# Patient Record
Sex: FEMALE | Race: WHITE | Employment: OTHER | URBAN - NONMETROPOLITAN AREA
[De-identification: names, ages, dates, MRNs, and addresses within clinical notes are randomized per-mention and may not be internally consistent; named-entity substitution may affect disease eponyms.]

---

## 2018-07-06 LAB — DIABETIC RETINOPATHY: NEGATIVE

## 2019-04-16 LAB
AVERAGE GLUCOSE: NORMAL
HBA1C MFR BLD: 6.2 %

## 2019-05-29 DIAGNOSIS — R10.84 GENERALIZED ABDOMINAL PAIN: ICD-10-CM

## 2019-05-29 DIAGNOSIS — R63.4 WEIGHT LOSS: Primary | ICD-10-CM

## 2019-05-30 ENCOUNTER — OFFICE VISIT (OUTPATIENT)
Dept: PRIMARY CARE CLINIC | Age: 64
End: 2019-05-30
Payer: COMMERCIAL

## 2019-05-30 VITALS
HEIGHT: 62 IN | WEIGHT: 170 LBS | RESPIRATION RATE: 18 BRPM | BODY MASS INDEX: 31.28 KG/M2 | DIASTOLIC BLOOD PRESSURE: 68 MMHG | HEART RATE: 96 BPM | TEMPERATURE: 97.9 F | OXYGEN SATURATION: 98 % | SYSTOLIC BLOOD PRESSURE: 118 MMHG

## 2019-05-30 DIAGNOSIS — I10 ESSENTIAL HYPERTENSION: ICD-10-CM

## 2019-05-30 DIAGNOSIS — E11.65 TYPE 2 DIABETES MELLITUS WITH HYPERGLYCEMIA, WITHOUT LONG-TERM CURRENT USE OF INSULIN (HCC): ICD-10-CM

## 2019-05-30 DIAGNOSIS — R14.0 ABDOMINAL DISTENTION: ICD-10-CM

## 2019-05-30 DIAGNOSIS — R10.84 GENERALIZED ABDOMINAL PAIN: Primary | ICD-10-CM

## 2019-05-30 DIAGNOSIS — E78.2 MIXED HYPERLIPIDEMIA: ICD-10-CM

## 2019-05-30 PROBLEM — D64.9 ANEMIA: Status: ACTIVE | Noted: 2019-05-30

## 2019-05-30 PROCEDURE — 99214 OFFICE O/P EST MOD 30 MIN: CPT | Performed by: INTERNAL MEDICINE

## 2019-05-30 RX ORDER — LISINOPRIL AND HYDROCHLOROTHIAZIDE 20; 12.5 MG/1; MG/1
1 TABLET ORAL DAILY
Refills: 1 | COMMUNITY
Start: 2019-04-10 | End: 2019-07-11

## 2019-05-30 RX ORDER — RANITIDINE 150 MG/1
1 TABLET ORAL DAILY
Refills: 1 | COMMUNITY
Start: 2019-04-10 | End: 2019-07-11

## 2019-05-30 RX ORDER — METFORMIN HYDROCHLORIDE 500 MG/1
1 TABLET, EXTENDED RELEASE ORAL 2 TIMES DAILY
Refills: 1 | COMMUNITY
Start: 2019-04-10 | End: 2019-07-11

## 2019-05-30 RX ORDER — MULTIVIT WITH MINERALS/LUTEIN
1000 TABLET ORAL DAILY
COMMUNITY
End: 2019-07-11

## 2019-05-30 RX ORDER — DICYCLOMINE HYDROCHLORIDE 10 MG/1
10 CAPSULE ORAL 4 TIMES DAILY
Qty: 40 CAPSULE | Refills: 3 | Status: SHIPPED | OUTPATIENT
Start: 2019-05-30 | End: 2019-07-11

## 2019-05-30 RX ORDER — SIMVASTATIN 10 MG
1 TABLET ORAL NIGHTLY
Refills: 1 | COMMUNITY
Start: 2019-04-10 | End: 2019-07-11

## 2019-05-30 SDOH — HEALTH STABILITY: MENTAL HEALTH: HOW OFTEN DO YOU HAVE A DRINK CONTAINING ALCOHOL?: NEVER

## 2019-05-30 NOTE — PROGRESS NOTES
2019    Name: Cynthia Huitron : 1955 Sex: female  Age: 58 y.o. Subjective:  Chief Complaint   Patient presents with    Abdominal Pain     symptoms x 1-2 months    Bloated        HPI     Patient has had increasing problems with abdominal discomfort, pain and cramping, occasionally has diarrhea. No constipation. No black stools or blood in the stools. She had a colonoscopy about a year ago which showed diverticuli and a polyp. . She says that this all started after she had her gallbladder out last year. Things are getting much worse and it's difficult for her to keep things down. She talked with a gastroenterologist who her on Carafate 1 g 3 times a day and it caused more diarrhea so she was off of it after 3 weeks. Review of Systems   Constitutional: Negative for fatigue and fever. Respiratory: Negative for chest tightness and shortness of breath. Cardiovascular: Negative for chest pain, palpitations and leg swelling. Gastrointestinal: Positive for abdominal distention, abdominal pain, diarrhea and nausea. Genitourinary: Negative for dysuria. Hematological: Does not bruise/bleed easily.           Current Outpatient Medications:     metFORMIN (GLUCOPHAGE-XR) 500 MG extended release tablet, Take 1 tablet by mouth 2 times daily, Disp: , Rfl: 1    lisinopril-hydrochlorothiazide (PRINZIDE;ZESTORETIC) 20-12.5 MG per tablet, Take 1 tablet by mouth daily, Disp: , Rfl: 1    simvastatin (ZOCOR) 10 MG tablet, Take 1 tablet by mouth nightly, Disp: , Rfl: 1    ranitidine (ZANTAC) 150 MG tablet, Take 1 tablet by mouth daily, Disp: , Rfl: 1    Cholecalciferol (VITAMIN D3) 25 MCG TABS, Take 1 tablet by mouth daily, Disp: , Rfl:     vitamin E 1000 units capsule, Take 1,000 Units by mouth daily, Disp: , Rfl:     Chromium-Cinnamon (CINNAMON PLUS CHROMIUM) 200-1000 MCG-MG CAPS, Take 2 tablets by mouth daily, Disp: , Rfl:     dicyclomine (BENTYL) 10 MG capsule, Take 1 capsule by mouth 4 times daily Prn abdominal pain, Disp: 40 capsule, Rfl: 3     No Known Allergies     Past Medical History:   Diagnosis Date    Abdominal distention 5/30/2019    Anemia 5/30/2019    Essential hypertension 5/30/2019    Generalized abdominal pain 5/30/2019    Mixed hyperlipidemia 5/30/2019    Type 2 diabetes mellitus with hyperglycemia, without long-term current use of insulin ([facility] [address] 5/30/2019       Health Maintenance Due   Topic Date Due    Potassium monitoring  1955    Creatinine monitoring  1955    Hepatitis C screen  1955    Pneumococcal 0-64 years Vaccine (1 of 1 - PPSV23) 12/31/1960    Diabetic foot exam  12/31/1964    A1C test (Diabetic or Prediabetic)  12/31/1964    Diabetic retinal exam  12/31/1964    Lipid screen  12/31/1964    HIV screen  12/31/1969    Diabetic microalbuminuria test  12/31/1972    DTaP/Tdap/Td vaccine (1 - Tdap) 12/31/1973    Cervical cancer screen  12/31/1975    Breast cancer screen  12/31/2004    Shingles Vaccine (1 of 2) 12/31/2004    Colon cancer screen colonoscopy  12/31/2004        Patient Active Problem List   Diagnosis    Generalized abdominal pain    Abdominal distention    Type 2 diabetes mellitus with hyperglycemia, without long-term current use of insulin ([facility] [address]    Essential hypertension    Mixed hyperlipidemia    Anemia        Past Surgical History:   Procedure Laterality Date    CHOLECYSTECTOMY  07/2018        No family history on file.      Social History     Tobacco Use    Smoking status: Never Smoker    Smokeless tobacco: Never Used   Substance Use Topics    Alcohol use: Never     Frequency: Never    Drug use: Not on file        Objective  Vitals:    05/30/19 1138   BP: 118/68   Site: Left Upper Arm   Position: Sitting   Cuff Size: Medium Adult   Pulse: 96   Resp: 18   Temp: 97.9 °F (36.6 °C)   TempSrc: Temporal   SpO2: 98%   Weight: 170 lb (77.1 kg)   Height: 5' 2\" (1.575 m)        Exam:  Physical Exam   Constitutional: She appears well-developed. No distress. Cardiovascular: Normal rate and normal heart sounds. Pulmonary/Chest: Effort normal and breath sounds normal.   Abdominal:   Abdomen is soft, slightly distended. Bowel sounds are present. She is tender to palpation in epigastric, right upper and left upper quadrant. No  rebound tenderness. Though she has some voluntary guarding        Last labs reviewed  Blood work from [facility] reviewed 4/16/2019. BUN 33, creatinine 1.4. Estimated GFR 38. Total cholesterol 112. Rest of lipids are good. White blood cell count was normal then. Hemoglobin low at 10.1 with hematocrit of 29.2. Normocytic normochromic indices. ASSESSMENT & PLAN :   Problem List        Circulatory    Essential hypertension     Blood pressures are stable. Continue medications and monitor blood pressures at home. Call office if systolics are over 725 over diastolics over 90. Relevant Medications    lisinopril-hydrochlorothiazide (PRINZIDE;ZESTORETIC) 20-12.5 MG per tablet    simvastatin (ZOCOR) 10 MG tablet       Endocrine    Type 2 diabetes mellitus with hyperglycemia, without long-term current use of insulin ([address]     Watch her diet. Monitor her blood sugars as directed. Let me know if her blood sugars are consistently elevated over 120 fasting.  If imaging tests are normal, consider getting a gastric emptying time to rule out gastroparesis         Relevant Medications    metFORMIN (GLUCOPHAGE-XR) 500 MG extended release tablet       Other    Generalized abdominal pain - Primary      Obtain Prior authorization for a CT scan of her abdomen with oral contrast only as she does have chronic kidney disease         Relevant Orders    CT ABDOMEN WO CONTRAST Additional Contrast? Oral    AMYLASE    LIPASE    Abdominal distention     Trial of Bentyl when necessary         Relevant Orders    CT ABDOMEN WO CONTRAST Additional Contrast? Oral    Mixed hyperlipidemia     Watch saturated fats in diet and will monitor lipids         Relevant Medications    lisinopril-hydrochlorothiazide (PRINZIDE;ZESTORETIC) 20-12.5 MG per tablet    simvastatin (ZOCOR) 10 MG tablet           Return in about 2 weeks (around 6/13/2019).        Alysia Beckford DO  6/2/2019

## 2019-06-02 ASSESSMENT — ENCOUNTER SYMPTOMS
ABDOMINAL PAIN: 1
NAUSEA: 1
ABDOMINAL DISTENTION: 1
SHORTNESS OF BREATH: 0
CHEST TIGHTNESS: 0
DIARRHEA: 1

## 2019-06-03 NOTE — ASSESSMENT & PLAN NOTE
Obtain Prior authorization for a CT scan of her abdomen with oral contrast only as she does have chronic kidney disease

## 2019-06-03 NOTE — ASSESSMENT & PLAN NOTE
Watch her diet. Monitor her blood sugars as directed. Let me know if her blood sugars are consistently elevated over 120 fasting.  If imaging tests are normal, consider getting a gastric emptying time to rule out gastroparesis

## 2019-06-03 NOTE — ASSESSMENT & PLAN NOTE
Blood pressures are stable. Continue medications and monitor blood pressures at home. Call office if systolics are over 729 over diastolics over 90.

## 2019-06-10 ENCOUNTER — TELEPHONE (OUTPATIENT)
Dept: PRIMARY CARE CLINIC | Age: 64
End: 2019-06-10

## 2019-06-10 NOTE — TELEPHONE ENCOUNTER
Patient is agreeable to see GI doctor. She has her colonoscopies done with Dr Marissa Willett. Are you able to put in a new referral to his office and I will fax it over? Thanks !

## 2019-06-11 ENCOUNTER — TELEPHONE (OUTPATIENT)
Dept: PRIMARY CARE CLINIC | Age: 64
End: 2019-06-11

## 2019-06-11 DIAGNOSIS — R10.84 GENERALIZED ABDOMINAL PAIN: Primary | ICD-10-CM

## 2019-06-11 DIAGNOSIS — R14.0 ABDOMINAL DISTENTION: ICD-10-CM

## 2019-06-11 NOTE — TELEPHONE ENCOUNTER
Patient called back. She wants the ultrasound of her abdomen done. I informed her that we faxed the order to [facility]. She says she sees Dr Filiberto Yadav at the hospital. When she gets the u/s results she will just show him and then we will go from there on if she needs to see him or not.

## 2019-06-11 NOTE — TELEPHONE ENCOUNTER
Referral made to Dr. Melisa Baumgarten  Please call his office and find out how soon they can see her

## 2019-06-19 DIAGNOSIS — R10.84 GENERALIZED ABDOMINAL PAIN: ICD-10-CM

## 2019-06-19 DIAGNOSIS — R14.0 ABDOMINAL DISTENTION: ICD-10-CM

## 2019-06-26 ENCOUNTER — OFFICE VISIT (OUTPATIENT)
Dept: PRIMARY CARE CLINIC | Age: 64
End: 2019-06-26
Payer: COMMERCIAL

## 2019-06-26 VITALS
HEART RATE: 70 BPM | SYSTOLIC BLOOD PRESSURE: 124 MMHG | WEIGHT: 178 LBS | HEIGHT: 62 IN | BODY MASS INDEX: 32.76 KG/M2 | DIASTOLIC BLOOD PRESSURE: 62 MMHG

## 2019-06-26 VITALS
DIASTOLIC BLOOD PRESSURE: 60 MMHG | SYSTOLIC BLOOD PRESSURE: 120 MMHG | WEIGHT: 160.3 LBS | RESPIRATION RATE: 18 BRPM | BODY MASS INDEX: 29.5 KG/M2 | TEMPERATURE: 98.7 F | OXYGEN SATURATION: 96 % | HEIGHT: 62 IN | HEART RATE: 98 BPM

## 2019-06-26 DIAGNOSIS — D64.9 ANEMIA, UNSPECIFIED TYPE: ICD-10-CM

## 2019-06-26 DIAGNOSIS — E11.65 TYPE 2 DIABETES MELLITUS WITH HYPERGLYCEMIA, WITHOUT LONG-TERM CURRENT USE OF INSULIN (HCC): ICD-10-CM

## 2019-06-26 DIAGNOSIS — R10.84 GENERALIZED ABDOMINAL PAIN: Primary | ICD-10-CM

## 2019-06-26 DIAGNOSIS — R16.0 HEPATOMEGALY: ICD-10-CM

## 2019-06-26 DIAGNOSIS — I10 ESSENTIAL HYPERTENSION: ICD-10-CM

## 2019-06-26 PROBLEM — N18.30 ACUTE RENAL FAILURE SUPERIMPOSED ON STAGE 3 CHRONIC KIDNEY DISEASE (HCC): Status: ACTIVE | Noted: 2019-06-26

## 2019-06-26 PROBLEM — N17.9 ACUTE RENAL FAILURE SUPERIMPOSED ON STAGE 3 CHRONIC KIDNEY DISEASE (HCC): Status: ACTIVE | Noted: 2019-06-26

## 2019-06-26 PROCEDURE — 99215 OFFICE O/P EST HI 40 MIN: CPT | Performed by: INTERNAL MEDICINE

## 2019-06-26 RX ORDER — SUCRALFATE 1 G/1
1 TABLET ORAL 2 TIMES DAILY
COMMUNITY
End: 2019-07-11

## 2019-06-26 ASSESSMENT — ENCOUNTER SYMPTOMS
ABDOMINAL DISTENTION: 1
EYE ITCHING: 0
ANAL BLEEDING: 0
COLOR CHANGE: 0
EYE PAIN: 0
VOMITING: 0
SHORTNESS OF BREATH: 0
NAUSEA: 1
CHEST TIGHTNESS: 0
ABDOMINAL PAIN: 1
FACIAL SWELLING: 0
WHEEZING: 0
SORE THROAT: 0
TROUBLE SWALLOWING: 0
STRIDOR: 0
COUGH: 0
PHOTOPHOBIA: 0
BLOOD IN STOOL: 0
RHINORRHEA: 0
DIARRHEA: 1
CONSTIPATION: 0
EYE DISCHARGE: 0

## 2019-06-26 NOTE — PROGRESS NOTES
2019    Name: Charlotte Niño : 1955 Sex: female  Age: 58 y.o. Subjective:  Chief Complaint   Patient presents with    Results        HPI     Patient has had increasing problems with abdominal bloating, pain and cramping, occasionally has diarrhea. No constipation. No black stools or blood in the stools. She had a colonoscopy about a year ago which showed diverticuli and a polyp. . She says that this all started after she had her gallbladder out last year. Things are getting much worse and it's difficult for her to keep things down. She talked with a gastroenterologist who her on Carafate 1 g 3 times a day and it caused more diarrhea so she was off of it after 3 weeks. Requested CT scan of her abdomen but insurance denied this. Ultrasound was done and her liver was enlarged and she had a 15 mm hyperdense lesion in the central portion of the right lobe of the liver. Patient contacted Bob Jimenes who recommended an MRI which again was denied by insurance. He obtained some blood work on 2019. Hemoglobin dropped to 7.2 and hematocrit down to 21.5  This is  almost a 3 g drop from her  Hemoglobin in 2019. Her creatinine also increased. Her estimated GFR was 30, it was 38 in April. She is a little acidotic with a CO2 of 17 anion gap of 13. Nonfasting blood sugar was only 113. Doctor Marielle Cosby  ordered some other studies including a hep C antibody which was negative, anti-smooth muscle, antibody mitochondrial antibody which are pending. TAMIKO was pending and her alpha-1 antitrypsin level was elevated at 319. Patient feels tired and is weaker than  when I last saw her. Review of Systems   Constitutional: Positive for appetite change and fatigue. Negative for fever and unexpected weight change. Decreased appetite   HENT: Negative for congestion, ear pain, facial swelling, rhinorrhea, sore throat, tinnitus and trouble swallowing.     Eyes: Negative for photophobia, pain, discharge, itching and visual disturbance. Respiratory: Negative for cough, chest tightness, shortness of breath, wheezing and stridor. Cardiovascular: Negative for chest pain, palpitations and leg swelling. Gastrointestinal: Positive for abdominal distention, abdominal pain, diarrhea and nausea. Negative for anal bleeding, blood in stool, constipation and vomiting. Endocrine: Negative for cold intolerance, heat intolerance, polydipsia, polyphagia and polyuria. Genitourinary: Negative for difficulty urinating, dysuria, flank pain, frequency, hematuria and urgency. Musculoskeletal: Negative for arthralgias, gait problem, joint swelling and myalgias. Skin: Negative for color change, pallor and rash. Allergic/Immunologic: Negative for environmental allergies and food allergies. Neurological: Positive for weakness. Negative for dizziness, tremors, seizures, syncope, speech difficulty, light-headedness, numbness and headaches. Hematological: Negative for adenopathy. Does not bruise/bleed easily. Psychiatric/Behavioral: Negative for agitation, behavioral problems, confusion, sleep disturbance and suicidal ideas. The patient is not nervous/anxious.            Current Outpatient Medications:     aspirin 81 MG tablet, Take 81 mg by mouth daily, Disp: , Rfl:     hyoscyamine (LEVSIN/SL) 125 MCG sublingual tablet, Place 125 mcg under the tongue 3 times daily as needed (ABDOMINAL PAIN), Disp: , Rfl:     sucralfate (CARAFATE) 1 GM tablet, Take 1 g by mouth 2 times daily AS DIRECTED, Disp: , Rfl:     metFORMIN (GLUCOPHAGE-XR) 500 MG extended release tablet, Take 1 tablet by mouth 2 times daily, Disp: , Rfl: 1    lisinopril-hydrochlorothiazide (PRINZIDE;ZESTORETIC) 20-12.5 MG per tablet, Take 1 tablet by mouth daily, Disp: , Rfl: 1    simvastatin (ZOCOR) 10 MG tablet, Take 1 tablet by mouth nightly, Disp: , Rfl: 1    ranitidine (ZANTAC) 150 MG tablet, Take 1 tablet by mouth daily, Disp: , Rfl: 1   Cholecalciferol (VITAMIN D3) 25 MCG TABS, Take 1 tablet by mouth daily, Disp: , Rfl:     vitamin E 1000 units capsule, Take 1,000 Units by mouth daily, Disp: , Rfl:     Chromium-Cinnamon (CINNAMON PLUS CHROMIUM) 200-1000 MCG-MG CAPS, Take 2 tablets by mouth daily, Disp: , Rfl:     dicyclomine (BENTYL) 10 MG capsule, Take 1 capsule by mouth 4 times daily Prn abdominal pain, Disp: 40 capsule, Rfl: 3     No Known Allergies     Past Medical History:   Diagnosis Date    Abdominal distention 5/30/2019    Anemia 5/30/2019    Colon polyp     Eczema     Essential hypertension 5/30/2019    Generalized abdominal pain 5/30/2019    GERD (gastroesophageal reflux disease)     Mixed hyperlipidemia 5/30/2019    Osteoarthritis     Type 2 diabetes mellitus with hyperglycemia, without long-term current use of insulin ([facility] [address] 5/30/2019       Health Maintenance Due   Topic Date Due    Potassium monitoring  1955    Creatinine monitoring  1955    Hepatitis C screen  1955    Diabetic foot exam  12/31/1964    Lipid screen  12/31/1964    HIV screen  12/31/1969    Diabetic microalbuminuria test  12/31/1972    Cervical cancer screen  12/31/1975    Shingles Vaccine (1 of 2) 12/31/2004    DTaP/Tdap/Td vaccine (1 - Tdap) 06/03/2012    Breast cancer screen  05/14/2019    Diabetic retinal exam  07/06/2019        Patient Active Problem List   Diagnosis    Generalized abdominal pain    Abdominal distention    Type 2 diabetes mellitus with hyperglycemia, without long-term current use of insulin (HCC)    Essential hypertension    Mixed hyperlipidemia    Anemia    Acute renal failure superimposed on stage 3 chronic kidney disease (HCC)    Hepatomegaly        Past Surgical History:   Procedure Laterality Date    CHOLECYSTECTOMY  07/2018        Family History   Problem Relation Age of Onset    Heart Attack Mother     Colon Cancer Father         Social History     Tobacco Use    Smoking status: Never Smoker    Smokeless tobacco: Never Used   Substance Use Topics    Alcohol use: Never     Frequency: Never    Drug use: Never        Objective  Vitals:    06/26/19 1602   BP: 120/60   Site: Left Upper Arm   Position: Sitting   Cuff Size: Medium Adult   Pulse: 98   Resp: 18   Temp: 98.7 °F (37.1 °C)   TempSrc: Temporal   SpO2: 96%   Weight: 160 lb 4.8 oz (72.7 kg)   Height: 5' 2\" (1.575 m)        Exam:  Physical Exam   Constitutional: She is oriented to person, place, and time. She appears well-developed and well-nourished. No distress. HENT:   Head: Normocephalic. Right Ear: External ear normal.   Left Ear: External ear normal.   Nose: Nose normal.   Mouth/Throat: Oropharynx is clear and moist. No oropharyngeal exudate. Eyes: Pupils are equal, round, and reactive to light. EOM are normal. Right eye exhibits no discharge. Left eye exhibits no discharge. No scleral icterus. Pale conjunctiva   Neck: Normal range of motion. Neck supple. No thyromegaly present. Cardiovascular: Normal rate, regular rhythm and intact distal pulses. Exam reveals no gallop and no friction rub. Murmur heard. Soft systolic murmur   Pulmonary/Chest: Effort normal and breath sounds normal. No respiratory distress. She has no wheezes. She has no rales. She exhibits no tenderness. Abdominal: Soft. Bowel sounds are normal. She exhibits no distension and no mass. There is no tenderness. There is no rebound and no guarding. Abdomen is soft, slightly distended. Bowel sounds are present. She is tender to palpation in epigastric, right upper and left upper quadrant. No  rebound tenderness. Though she has some voluntary guarding. Liver is enlarged  Rectal exam revealed no stool in the rectal vault. I tried to do Hemoccult but there was not enough material to do a proper Hemoccult test.   Musculoskeletal: Normal range of motion. She exhibits no edema, tenderness or deformity. Lymphadenopathy:     She has no cervical adenopathy. Neurological: She is alert and oriented to person, place, and time. She displays normal reflexes. No cranial nerve deficit or sensory deficit. Skin: Skin is warm and dry. No rash noted. No erythema. No pallor. Psychiatric: She has a normal mood and affect. Her behavior is normal. Judgment and thought content normal.        Last labs reviewed  Blood work from [facility] reviewed 4/16/2019. BUN 33, creatinine 1.4. Estimated GFR 38. Total cholesterol 112. Rest of lipids are good. White blood cell count was normal then. Hemoglobin low at 10.1 with hematocrit of 29.2. Normocytic normochromic indices. Blood work from [facility] dated 6/20/2019 showed increased anion gap of 13 decreased CO2 17 BUN elevated at 34 creatinine 1.7. Estimated GFR is decreased to 30. Globulin elevated at 4.4 Albumin decreased to 3.2. Liver functions are normal.  White blood cell count was normal with a left shift. However hemoglobin  is now 7.2 with a hematocrit of 21.5. Platelet count elevated at 663,000,   Hep C antibody negative. smooth muscle antibody and antimitochondrial antibody pending. Alpha-1 antitrypsin level was  elevated at 319, TAMIKO pending    ASSESSMENT & PLAN :   Problem List        Circulatory    Essential hypertension     Blood pressure is good but we may need to discontinue lisinopril/hydrochlorothiazide in view of her ARMIDA. Patient is to be sent to admitting for observation,   discussed with hospitalist.         Relevant Medications    lisinopril-hydrochlorothiazide (PRINZIDE;ZESTORETIC) 20-12.5 MG per tablet    simvastatin (ZOCOR) 10 MG tablet    aspirin 81 MG tablet       Digestive    Hepatomegaly     Patient needs further imaging which will be done while she is in the hospital            Endocrine    Type 2 diabetes mellitus with hyperglycemia, without long-term current use of insulin (HCC)     Blood sugars are good.   She is on metformin which will need to be held in view of her kidney function. Will probably be started on something else in the hospital         Relevant Medications    metFORMIN (GLUCOPHAGE-XR) 500 MG extended release tablet       Other    Generalized abdominal pain - Primary     Admit observation at [facility]         Anemia     Will need work-up. Could be due to iron deficiency from acute on chronic blood loss. May also partially be secondary to chronic kidney disease. Return in about 1 month (around 7/24/2019) for Call me on discharge from [facility].        Joy Roy,   6/26/2019

## 2019-06-26 NOTE — ASSESSMENT & PLAN NOTE
Blood pressure is good but we may need to discontinue lisinopril/hydrochlorothiazide in view of her ARMIDA.   Patient is to be sent to admitting for observation,   discussed with hospitalist.

## 2019-06-26 NOTE — ASSESSMENT & PLAN NOTE
Blood sugars are good. She is on metformin which will need to be held in view of her kidney function.   Will probably be started on something else in the hospital

## 2019-06-26 NOTE — PATIENT INSTRUCTIONS
Supervision admission to [facility]. Hospitalist agreed to take patient.   Admitting informed

## 2019-07-09 ENCOUNTER — TELEPHONE (OUTPATIENT)
Dept: PRIMARY CARE CLINIC | Age: 64
End: 2019-07-09

## 2019-07-11 ENCOUNTER — TELEPHONE (OUTPATIENT)
Dept: ADMINISTRATIVE | Age: 64
End: 2019-07-11

## 2019-07-11 ENCOUNTER — OFFICE VISIT (OUTPATIENT)
Dept: PRIMARY CARE CLINIC | Age: 64
End: 2019-07-11
Payer: COMMERCIAL

## 2019-07-11 VITALS
HEIGHT: 62 IN | SYSTOLIC BLOOD PRESSURE: 124 MMHG | BODY MASS INDEX: 30.18 KG/M2 | WEIGHT: 164 LBS | OXYGEN SATURATION: 95 % | HEART RATE: 89 BPM | RESPIRATION RATE: 16 BRPM | DIASTOLIC BLOOD PRESSURE: 62 MMHG | TEMPERATURE: 98.6 F

## 2019-07-11 DIAGNOSIS — I10 ESSENTIAL HYPERTENSION: ICD-10-CM

## 2019-07-11 DIAGNOSIS — D49.59 OVARIAN NEOPLASM: Primary | ICD-10-CM

## 2019-07-11 DIAGNOSIS — D49.59 OVARY NEOPLASM: ICD-10-CM

## 2019-07-11 DIAGNOSIS — E11.65 TYPE 2 DIABETES MELLITUS WITH HYPERGLYCEMIA, WITHOUT LONG-TERM CURRENT USE OF INSULIN (HCC): ICD-10-CM

## 2019-07-11 DIAGNOSIS — D50.0 IRON DEFICIENCY ANEMIA DUE TO CHRONIC BLOOD LOSS: ICD-10-CM

## 2019-07-11 PROCEDURE — 99214 OFFICE O/P EST MOD 30 MIN: CPT | Performed by: INTERNAL MEDICINE

## 2019-07-11 RX ORDER — M-VIT,TX,IRON,MINS/CALC/FOLIC 27MG-0.4MG
1 TABLET ORAL DAILY
COMMUNITY

## 2019-07-11 RX ORDER — PANTOPRAZOLE SODIUM 40 MG/1
1 TABLET, DELAYED RELEASE ORAL DAILY
Refills: 0 | COMMUNITY
Start: 2019-06-28 | End: 2019-07-11

## 2019-07-11 ASSESSMENT — ENCOUNTER SYMPTOMS
ABDOMINAL DISTENTION: 1
EYE ITCHING: 0
EYE DISCHARGE: 0
ABDOMINAL PAIN: 0
RHINORRHEA: 0
FACIAL SWELLING: 0
DIARRHEA: 0
COUGH: 0
SORE THROAT: 0
ANAL BLEEDING: 0
WHEEZING: 0
VOMITING: 0
STRIDOR: 0
COLOR CHANGE: 0
EYE PAIN: 0
SHORTNESS OF BREATH: 0
CONSTIPATION: 0
TROUBLE SWALLOWING: 0
NAUSEA: 0
BLOOD IN STOOL: 0
PHOTOPHOBIA: 0

## 2019-07-11 NOTE — PROGRESS NOTES
2019    Name: Jennifer Murphy : 1955 Sex: female  Age: 58 y.o. Subjective:  Chief Complaint   Patient presents with    Results     to review results. KEVIN Flanagan was hospitalized with severe anemia and ascites. She was transfused with 1 unit of packed red blood cells and started on oral iron. No CBC has been rechecked. MRI of her abdomen was done which showed a lesion in her liver consistent with a hemangioma. Paracentesis was done which revealed cells which were positive for malignancy. Dr. Sigifredo Rivera ordered an ultrasound of her pelvis on 7/10/2019. This revealed endometrial thickening. There were  right ovarian and adnexal abnormalities suggestive of malignancy. She I sstill weak. She has gone back to work. She was hospitalized from 2019 and discharged on 2019. The week after that she was on vacation and then she returned to work full-time. EGD was done on Monday which was negative for abnormalities. per patient. She has had intermittent abdominal bloating since 2019. We discussed referral to gynecologist and gynecological oncological isurgeon. Recommended Dr. Lizeth Aguayo at [facility] in [place]. Because of her insurance she had will need to see a gynecologist in the network  first.  She chose Dr. Ailyn Coley. I contacted Dr. Deonte Land office and made arrangements for her to be seen in a timely fashion. Ultrasound results and all other labs will be faxed to Dr. Deonte Land office. On discharge she was taken off all her medications with exceptions of some vitamins. This included Carafate, simvastatin, ranitidine, pantoprazole, metformin, lisinopril hydrochlorothiazide    Review of Systems   Constitutional: Positive for fatigue and unexpected weight change. Negative for appetite change. HENT: Negative for congestion, ear pain, facial swelling, rhinorrhea, sore throat, tinnitus and trouble swallowing.     Eyes: Negative for photophobia,

## 2019-07-11 NOTE — PATIENT INSTRUCTIONS
Get blood work done at [facility] tomorrow  Dr Valencia Leyden office will call you with appointment

## 2019-07-12 ENCOUNTER — TELEPHONE (OUTPATIENT)
Dept: PRIMARY CARE CLINIC | Age: 64
End: 2019-07-12

## 2019-07-12 DIAGNOSIS — D50.0 IRON DEFICIENCY ANEMIA DUE TO CHRONIC BLOOD LOSS: ICD-10-CM

## 2019-07-12 LAB
BASOPHILS ABSOLUTE: ABNORMAL /ΜL
BASOPHILS RELATIVE PERCENT: ABNORMAL %
CA 125: 270.9
EOSINOPHILS ABSOLUTE: ABNORMAL /ΜL
EOSINOPHILS RELATIVE PERCENT: ABNORMAL %
FERRITIN: 398.8 NG/ML (ref 9–150)
HCT VFR BLD CALC: 24.5 % (ref 36–46)
HEMOGLOBIN: 8.2 G/DL (ref 12–16)
IRON: 26
LYMPHOCYTES ABSOLUTE: ABNORMAL /ΜL
LYMPHOCYTES RELATIVE PERCENT: ABNORMAL %
MCH RBC QN AUTO: 28.5 PG
MCHC RBC AUTO-ENTMCNC: 33.5 G/DL
MCV RBC AUTO: 85.1 FL
MONOCYTES ABSOLUTE: ABNORMAL /ΜL
MONOCYTES RELATIVE PERCENT: ABNORMAL %
NEUTROPHILS ABSOLUTE: ABNORMAL /ΜL
NEUTROPHILS RELATIVE PERCENT: ABNORMAL %
PDW BLD-RTO: ABNORMAL %
PLATELET # BLD: 563 K/ΜL
PMV BLD AUTO: ABNORMAL FL
RBC # BLD: 2.88 10^6/ΜL
TOTAL IRON BINDING CAPACITY: 223
WBC # BLD: 8.3 10^3/ML

## 2019-07-15 ENCOUNTER — TELEPHONE (OUTPATIENT)
Dept: PRIMARY CARE CLINIC | Age: 64
End: 2019-07-15

## 2019-07-15 NOTE — TELEPHONE ENCOUNTER
Patient informed. She is scheduled to see Dr Arvin Denver tomorrow, 7/15/19. She does not want to see Dr Candy Nguyen at this time.

## 2019-07-17 ENCOUNTER — TELEPHONE (OUTPATIENT)
Dept: PRIMARY CARE CLINIC | Age: 64
End: 2019-07-17

## 2019-08-01 DIAGNOSIS — D50.0 IRON DEFICIENCY ANEMIA DUE TO CHRONIC BLOOD LOSS: ICD-10-CM

## 2019-08-01 DIAGNOSIS — D49.59 OVARIAN NEOPLASM: ICD-10-CM

## 2019-08-16 LAB — DIABETIC RETINOPATHY: NEGATIVE

## 2019-08-22 LAB
ALBUMIN SERPL-MCNC: 4 G/DL
ALP BLD-CCNC: 82 U/L
ALT SERPL-CCNC: 27 U/L
ANION GAP SERPL CALCULATED.3IONS-SCNC: 7 MMOL/L
AST SERPL-CCNC: 26 U/L
BASOPHILS ABSOLUTE: 0 /ΜL
BASOPHILS RELATIVE PERCENT: 0.6 %
BILIRUB SERPL-MCNC: 0.6 MG/DL (ref 0.1–1.4)
BUN BLDV-MCNC: 33 MG/DL
CALCIUM SERPL-MCNC: 9.7 MG/DL
CHLORIDE BLD-SCNC: 105 MMOL/L
CO2: 27 MMOL/L
CREAT SERPL-MCNC: 1.1 MG/DL
EOSINOPHILS ABSOLUTE: 0.1 /ΜL
EOSINOPHILS RELATIVE PERCENT: 1.2 %
GFR CALCULATED: 50
GLUCOSE BLD-MCNC: 101 MG/DL
HCT VFR BLD CALC: 25.9 % (ref 36–46)
HEMOGLOBIN: 8.7 G/DL (ref 12–16)
LYMPHOCYTES ABSOLUTE: 0.9 /ΜL
LYMPHOCYTES RELATIVE PERCENT: 18.4 %
MCH RBC QN AUTO: 29.4 PG
MCHC RBC AUTO-ENTMCNC: 33.5 G/DL
MCV RBC AUTO: 87.7 FL
MONOCYTES ABSOLUTE: 0.4 /ΜL
MONOCYTES RELATIVE PERCENT: 8.9 %
NEUTROPHILS ABSOLUTE: 3.4 /ΜL
NEUTROPHILS RELATIVE PERCENT: 70.9 %
PDW BLD-RTO: 19.6 %
PLATELET # BLD: 390 K/ΜL
PMV BLD AUTO: 7.3 FL
POTASSIUM SERPL-SCNC: 4 MMOL/L
RBC # BLD: 2.95 10^6/ΜL
SODIUM BLD-SCNC: 139 MMOL/L
TOTAL PROTEIN: 7.5
WBC # BLD: 4.8 10^3/ML

## 2019-10-03 LAB — MAGNESIUM: 1.7 MG/DL

## 2019-12-05 ASSESSMENT — ENCOUNTER SYMPTOMS
RHINORRHEA: 0
STRIDOR: 0
ANAL BLEEDING: 0
EYE DISCHARGE: 0
ABDOMINAL PAIN: 0
NAUSEA: 0
FACIAL SWELLING: 0
COUGH: 0
DIARRHEA: 0
EYE ITCHING: 0
SORE THROAT: 0
VOMITING: 0
SHORTNESS OF BREATH: 0
PHOTOPHOBIA: 0
EYE PAIN: 0
CONSTIPATION: 0
WHEEZING: 0
TROUBLE SWALLOWING: 0
COLOR CHANGE: 0
BLOOD IN STOOL: 0

## 2019-12-10 RX ORDER — SUCRALFATE 1 G/1
1 TABLET ORAL 2 TIMES DAILY
COMMUNITY
End: 2019-12-12

## 2019-12-10 RX ORDER — METFORMIN HYDROCHLORIDE 500 MG/1
500 TABLET, EXTENDED RELEASE ORAL 2 TIMES DAILY
COMMUNITY
End: 2019-12-12 | Stop reason: ALTCHOICE

## 2019-12-10 RX ORDER — LISINOPRIL AND HYDROCHLOROTHIAZIDE 20; 12.5 MG/1; MG/1
1 TABLET ORAL DAILY
COMMUNITY
End: 2019-12-12 | Stop reason: ALTCHOICE

## 2019-12-10 RX ORDER — RANITIDINE 150 MG/1
150 TABLET ORAL NIGHTLY
COMMUNITY
End: 2020-06-09

## 2019-12-10 RX ORDER — SIMVASTATIN 10 MG
10 TABLET ORAL DAILY
COMMUNITY
End: 2019-12-12 | Stop reason: ALTCHOICE

## 2019-12-12 ENCOUNTER — TELEPHONE (OUTPATIENT)
Dept: PRIMARY CARE CLINIC | Age: 64
End: 2019-12-12

## 2019-12-12 ENCOUNTER — OFFICE VISIT (OUTPATIENT)
Dept: PRIMARY CARE CLINIC | Age: 64
End: 2019-12-12
Payer: COMMERCIAL

## 2019-12-12 VITALS
RESPIRATION RATE: 16 BRPM | HEART RATE: 88 BPM | SYSTOLIC BLOOD PRESSURE: 132 MMHG | TEMPERATURE: 98.3 F | WEIGHT: 152 LBS | HEIGHT: 62 IN | DIASTOLIC BLOOD PRESSURE: 80 MMHG | BODY MASS INDEX: 27.97 KG/M2 | OXYGEN SATURATION: 98 %

## 2019-12-12 DIAGNOSIS — E11.65 TYPE 2 DIABETES MELLITUS WITH HYPERGLYCEMIA, WITHOUT LONG-TERM CURRENT USE OF INSULIN (HCC): ICD-10-CM

## 2019-12-12 DIAGNOSIS — K21.9 GASTROESOPHAGEAL REFLUX DISEASE, ESOPHAGITIS PRESENCE NOT SPECIFIED: ICD-10-CM

## 2019-12-12 DIAGNOSIS — I10 ESSENTIAL HYPERTENSION: Primary | ICD-10-CM

## 2019-12-12 DIAGNOSIS — Z12.31 SCREENING MAMMOGRAM FOR HIGH-RISK PATIENT: ICD-10-CM

## 2019-12-12 DIAGNOSIS — N18.30 STAGE 3 CHRONIC KIDNEY DISEASE (HCC): ICD-10-CM

## 2019-12-12 DIAGNOSIS — E78.2 MIXED HYPERLIPIDEMIA: ICD-10-CM

## 2019-12-12 DIAGNOSIS — D64.9 ANEMIA, UNSPECIFIED TYPE: ICD-10-CM

## 2019-12-12 DIAGNOSIS — C56.9 MALIGNANT NEOPLASM OF OVARY, UNSPECIFIED LATERALITY (HCC): ICD-10-CM

## 2019-12-12 PROBLEM — N17.9 ACUTE RENAL FAILURE SUPERIMPOSED ON STAGE 3 CHRONIC KIDNEY DISEASE (HCC): Status: RESOLVED | Noted: 2019-06-26 | Resolved: 2019-12-12

## 2019-12-12 PROCEDURE — G8417 CALC BMI ABV UP PARAM F/U: HCPCS | Performed by: INTERNAL MEDICINE

## 2019-12-12 PROCEDURE — 2022F DILAT RTA XM EVC RTNOPTHY: CPT | Performed by: INTERNAL MEDICINE

## 2019-12-12 PROCEDURE — 3044F HG A1C LEVEL LT 7.0%: CPT | Performed by: INTERNAL MEDICINE

## 2019-12-12 PROCEDURE — 99214 OFFICE O/P EST MOD 30 MIN: CPT | Performed by: INTERNAL MEDICINE

## 2019-12-12 PROCEDURE — G9899 SCRN MAM PERF RSLTS DOC: HCPCS | Performed by: INTERNAL MEDICINE

## 2019-12-12 PROCEDURE — 3017F COLORECTAL CA SCREEN DOC REV: CPT | Performed by: INTERNAL MEDICINE

## 2019-12-12 PROCEDURE — 1036F TOBACCO NON-USER: CPT | Performed by: INTERNAL MEDICINE

## 2019-12-12 PROCEDURE — G8484 FLU IMMUNIZE NO ADMIN: HCPCS | Performed by: INTERNAL MEDICINE

## 2019-12-12 PROCEDURE — G8427 DOCREV CUR MEDS BY ELIG CLIN: HCPCS | Performed by: INTERNAL MEDICINE

## 2019-12-12 RX ORDER — HYDROCHLOROTHIAZIDE 25 MG/1
12.5 TABLET ORAL DAILY
COMMUNITY
End: 2021-01-21

## 2019-12-12 RX ORDER — BLOOD SUGAR DIAGNOSTIC
STRIP MISCELLANEOUS
COMMUNITY
End: 2019-12-12 | Stop reason: SDUPTHER

## 2019-12-12 RX ORDER — DIPHENHYDRAMINE HYDROCHLORIDE 25 MG/1
CAPSULE, LIQUID FILLED ORAL
Qty: 1 KIT | Refills: 0 | Status: SHIPPED | OUTPATIENT
Start: 2019-12-12

## 2019-12-12 RX ORDER — BLOOD SUGAR DIAGNOSTIC
STRIP MISCELLANEOUS
Qty: 100 EACH | Refills: 5 | Status: SHIPPED | OUTPATIENT
Start: 2019-12-12

## 2019-12-12 RX ORDER — LANCETS 23 GAUGE
EACH MISCELLANEOUS
Qty: 100 EACH | Refills: 3 | Status: SHIPPED | OUTPATIENT
Start: 2019-12-12

## 2019-12-12 RX ORDER — FERROUS SULFATE 325(65) MG
325 TABLET ORAL
COMMUNITY

## 2019-12-12 RX ORDER — DIPHENHYDRAMINE HYDROCHLORIDE 25 MG/1
CAPSULE, LIQUID FILLED ORAL
COMMUNITY
End: 2019-12-12 | Stop reason: SDUPTHER

## 2019-12-12 RX ORDER — LANCETS 23 GAUGE
EACH MISCELLANEOUS
COMMUNITY
End: 2019-12-12 | Stop reason: SDUPTHER

## 2019-12-12 ASSESSMENT — ENCOUNTER SYMPTOMS: ABDOMINAL DISTENTION: 0

## 2019-12-12 ASSESSMENT — PATIENT HEALTH QUESTIONNAIRE - PHQ9
2. FEELING DOWN, DEPRESSED OR HOPELESS: 1
1. LITTLE INTEREST OR PLEASURE IN DOING THINGS: 1
SUM OF ALL RESPONSES TO PHQ QUESTIONS 1-9: 2
SUM OF ALL RESPONSES TO PHQ9 QUESTIONS 1 & 2: 2
SUM OF ALL RESPONSES TO PHQ QUESTIONS 1-9: 2

## 2019-12-26 LAB
A/G RATIO: 1.3 RATIO (ref 1.1–2.2)
ALBUMIN SERPL-MCNC: 4.3 G/DL (ref 3.4–4.8)
ALP BLD-CCNC: 90 U/L (ref 42–121)
ALT SERPL-CCNC: 24 U/L (ref 10–54)
ANION GAP SERPL CALCULATED.3IONS-SCNC: 8 MEQ/L (ref 3–11)
AST SERPL-CCNC: 27 U/L (ref 10–41)
BASOPHILS ABSOLUTE: 0 K/UL (ref 0–0.2)
BASOPHILS RELATIVE PERCENT: 0.7 % (ref 0–1.5)
BILIRUB SERPL-MCNC: 0.5 MG/DL (ref 0.3–1.5)
BUN BLDV-MCNC: 36 MG/DL (ref 8–21)
CALCIUM SERPL-MCNC: 9.7 MG/DL (ref 8.5–10.5)
CHLORIDE BLD-SCNC: 101 MEQ/L (ref 98–107)
CHOLESTEROL: 211 MG/DL (ref 140–200)
CO2: 26 MEQ/L (ref 21–31)
CREAT SERPL-MCNC: 1.1 MG/DL (ref 0.4–1)
CREATININE URINE: 121.3 MG/DL (ref 22–328)
DIFFERENTIAL, MANUAL: NO
EOSINOPHILS ABSOLUTE: 0.1 K/UL (ref 0–0.33)
EOSINOPHILS RELATIVE PERCENT: 1.7 % (ref 0–3)
ESTIMATED AVERAGE GLUCOSE: 120 MG/DL
GFR AFRICAN AMERICAN: 61 ML/MIN
GFR NON-AFRICAN AMERICAN: 50 ML/MIN
GLOBULIN: 3.2 G/DL (ref 1.9–3.9)
GLUCOSE BLD-MCNC: 120 MG/DL (ref 70–99)
HBA1C MFR BLD: 5.8 % (ref 4–6)
HCT VFR BLD CALC: 27.5 % (ref 36–44)
HDLC SERPL-MCNC: 79 MG/DL (ref 35–85)
HEMOGLOBIN: 9.6 G/DL (ref 12–15)
LDL CHOLESTEROL: 103 MG/DL
LDL/HDL RATIO: 1.3 RATIO
LYMPHOCYTES ABSOLUTE: 1.1 K/UL (ref 1.1–4.8)
LYMPHOCYTES RELATIVE PERCENT: 31.9 % (ref 24–44)
MCH RBC QN AUTO: 35.7 PG (ref 28–34)
MCHC RBC AUTO-ENTMCNC: 34.7 G/DL (ref 33–37)
MCV RBC AUTO: 102.7 FL (ref 80–100)
MICROALBUMIN UR-MCNC: 19.4 UG/ML
MICROALBUMIN/CREAT UR-RTO: 16 MG/G
MONOCYTES ABSOLUTE: 0.4 K/UL (ref 0.2–0.7)
MONOCYTES RELATIVE PERCENT: 11.9 % (ref 3.4–9)
NEUTROPHILS ABSOLUTE: 1.9 K/UL (ref 1.83–8.7)
PDW BLD-RTO: 14.3 % (ref 10.9–14.3)
PLATELET # BLD: 288 K/UL (ref 150–450)
PMV BLD AUTO: 6.8 FL (ref 7.4–10.4)
POTASSIUM SERPL-SCNC: 3.8 MEQ/L (ref 3.6–5)
RBC # BLD: 2.68 M/UL (ref 4–4.9)
SEGMENTED NEUTROPHILS RELATIVE PERCENT: 53.8 % (ref 40–74)
SODIUM BLD-SCNC: 135 MEQ/L (ref 135–145)
TOTAL PROTEIN: 7.5 G/DL (ref 5.9–7.8)
TRIGL SERPL-MCNC: 74 MG/DL (ref 41–189)
WBC # BLD: 3.5 K/UL (ref 4.5–11)

## 2019-12-30 PROBLEM — Z90.710 S/P HYSTERECTOMY: Status: ACTIVE | Noted: 2019-12-30

## 2020-06-09 ENCOUNTER — OFFICE VISIT (OUTPATIENT)
Dept: PRIMARY CARE CLINIC | Age: 65
End: 2020-06-09
Payer: COMMERCIAL

## 2020-06-09 VITALS
SYSTOLIC BLOOD PRESSURE: 100 MMHG | RESPIRATION RATE: 18 BRPM | OXYGEN SATURATION: 99 % | HEIGHT: 63 IN | DIASTOLIC BLOOD PRESSURE: 60 MMHG | TEMPERATURE: 98 F | WEIGHT: 181.2 LBS | BODY MASS INDEX: 32.11 KG/M2 | HEART RATE: 65 BPM

## 2020-06-09 PROBLEM — Z23 NEED FOR VACCINATION WITH 13-POLYVALENT PNEUMOCOCCAL CONJUGATE VACCINE: Status: ACTIVE | Noted: 2020-06-09

## 2020-06-09 PROBLEM — Z78.0 MENOPAUSE: Status: ACTIVE | Noted: 2020-06-09

## 2020-06-09 PROBLEM — K21.9 GERD (GASTROESOPHAGEAL REFLUX DISEASE): Status: RESOLVED | Noted: 2019-12-12 | Resolved: 2020-06-09

## 2020-06-09 PROBLEM — E11.65 TYPE 2 DIABETES MELLITUS WITH HYPERGLYCEMIA, WITHOUT LONG-TERM CURRENT USE OF INSULIN (HCC): Status: RESOLVED | Noted: 2019-05-30 | Resolved: 2020-06-09

## 2020-06-09 PROBLEM — I10 ESSENTIAL HYPERTENSION: Status: RESOLVED | Noted: 2019-05-30 | Resolved: 2020-06-09

## 2020-06-09 PROBLEM — E78.2 MIXED HYPERLIPIDEMIA: Status: RESOLVED | Noted: 2019-05-30 | Resolved: 2020-06-09

## 2020-06-09 PROCEDURE — G8400 PT W/DXA NO RESULTS DOC: HCPCS | Performed by: INTERNAL MEDICINE

## 2020-06-09 PROCEDURE — G8417 CALC BMI ABV UP PARAM F/U: HCPCS | Performed by: INTERNAL MEDICINE

## 2020-06-09 PROCEDURE — 3017F COLORECTAL CA SCREEN DOC REV: CPT | Performed by: INTERNAL MEDICINE

## 2020-06-09 PROCEDURE — G8427 DOCREV CUR MEDS BY ELIG CLIN: HCPCS | Performed by: INTERNAL MEDICINE

## 2020-06-09 PROCEDURE — 1036F TOBACCO NON-USER: CPT | Performed by: INTERNAL MEDICINE

## 2020-06-09 PROCEDURE — 1090F PRES/ABSN URINE INCON ASSESS: CPT | Performed by: INTERNAL MEDICINE

## 2020-06-09 PROCEDURE — 1123F ACP DISCUSS/DSCN MKR DOCD: CPT | Performed by: INTERNAL MEDICINE

## 2020-06-09 PROCEDURE — 99214 OFFICE O/P EST MOD 30 MIN: CPT | Performed by: INTERNAL MEDICINE

## 2020-06-09 PROCEDURE — 90670 PCV13 VACCINE IM: CPT | Performed by: INTERNAL MEDICINE

## 2020-06-09 PROCEDURE — 4040F PNEUMOC VAC/ADMIN/RCVD: CPT | Performed by: INTERNAL MEDICINE

## 2020-06-09 PROCEDURE — 3046F HEMOGLOBIN A1C LEVEL >9.0%: CPT | Performed by: INTERNAL MEDICINE

## 2020-06-09 PROCEDURE — 90471 IMMUNIZATION ADMIN: CPT | Performed by: INTERNAL MEDICINE

## 2020-06-09 PROCEDURE — 2022F DILAT RTA XM EVC RTNOPTHY: CPT | Performed by: INTERNAL MEDICINE

## 2020-06-09 ASSESSMENT — ENCOUNTER SYMPTOMS
RHINORRHEA: 0
SORE THROAT: 0
BLOOD IN STOOL: 0
SHORTNESS OF BREATH: 0
EYE ITCHING: 0
COUGH: 0
PHOTOPHOBIA: 0
EYE DISCHARGE: 0
ABDOMINAL PAIN: 0
CONSTIPATION: 0
EYE PAIN: 0
NAUSEA: 0
DIARRHEA: 0
ABDOMINAL DISTENTION: 0
BACK PAIN: 1
TROUBLE SWALLOWING: 0
STRIDOR: 0
VOMITING: 0
ANAL BLEEDING: 0
COLOR CHANGE: 0
WHEEZING: 0
FACIAL SWELLING: 0

## 2020-06-09 ASSESSMENT — PATIENT HEALTH QUESTIONNAIRE - PHQ9
1. LITTLE INTEREST OR PLEASURE IN DOING THINGS: 0
SUM OF ALL RESPONSES TO PHQ QUESTIONS 1-9: 0
2. FEELING DOWN, DEPRESSED OR HOPELESS: 0
SUM OF ALL RESPONSES TO PHQ9 QUESTIONS 1 & 2: 0
SUM OF ALL RESPONSES TO PHQ QUESTIONS 1-9: 0

## 2020-06-09 NOTE — PROGRESS NOTES
shot at [facility] on 9/26/2019. She had her eye examination done with Dr. Reshma Hall  in July 2019. We will get results. Review of Systems   Constitutional: Positive for fatigue. Negative for appetite change and unexpected weight change. HENT: Negative for congestion, ear pain, facial swelling, rhinorrhea, sore throat, tinnitus and trouble swallowing. Eyes: Negative for photophobia, pain, discharge, itching and visual disturbance. Respiratory: Negative for cough, shortness of breath, wheezing and stridor. Cardiovascular: Negative for chest pain, palpitations and leg swelling. Gastrointestinal: Negative for abdominal distention, abdominal pain, anal bleeding, blood in stool, constipation, diarrhea, nausea and vomiting. Endocrine: Negative for cold intolerance, heat intolerance, polydipsia, polyphagia and polyuria. Genitourinary: Negative for difficulty urinating, dysuria, flank pain, frequency, hematuria and urgency. Musculoskeletal: Positive for arthralgias and back pain. Negative for gait problem, joint swelling and myalgias. Skin: Negative for color change, pallor and rash. Allergic/Immunologic: Negative for environmental allergies and food allergies. Neurological: Negative for dizziness, tremors, seizures, syncope, speech difficulty, weakness, light-headedness, numbness and headaches. Hematological: Negative for adenopathy. Does not bruise/bleed easily. Psychiatric/Behavioral: Negative for agitation, behavioral problems, confusion, sleep disturbance and suicidal ideas. The patient is not nervous/anxious.            Current Outpatient Medications:     hydrochlorothiazide (HYDRODIURIL) 25 MG tablet, Take 12.5 mg by mouth daily , Disp: , Rfl:     ferrous sulfate 325 (65 Fe) MG tablet, Take 325 mg by mouth daily (with breakfast), Disp: , Rfl:     Blood Glucose Monitoring Suppl (BLOOD GLUCOSE MONITOR SYSTEM) w/Device KIT, Test blood sugars every morning and throughout the day PRN high or low blood sugar symptoms. , Disp: 1 kit, Rfl: 0    Alcohol Swabs (ALCOHOL PADS) 70 % PADS, Test blood sugars every morning and throughout the day PRN high or low blood sugar symptoms. , Disp: 100 each, Rfl: 5    Lancets 28G MISC, Test blood sugars every morning and throughout the day PRN high or low blood sugar symptoms. , Disp: 100 each, Rfl: 3    blood glucose test strips (ASCENSIA AUTODISC VI;ONE TOUCH ULTRA TEST VI) strip, Test blood sugars every morning and throughout the day PRN high or low blood sugar symptoms. , Disp: 100 each, Rfl: 5    acetaminophen (TYLENOL) 500 MG tablet, Take 500 mg by mouth every 6 hours as needed for Pain, Disp: , Rfl:     ibuprofen (ADVIL;MOTRIN) 200 MG tablet, Take 200 mg by mouth every 6 hours as needed for Pain, Disp: , Rfl:     Multiple Vitamins-Minerals (THERAPEUTIC MULTIVITAMIN-MINERALS) tablet, Take 1 tablet by mouth daily, Disp: , Rfl:     Cholecalciferol (VITAMIN D3) 1000 units CAPS, Take 1 capsule by mouth daily , Disp: , Rfl:     NONFORMULARY, Indications: coricidin as needed for cold , Disp: , Rfl:      Allergies   Allergen Reactions    Adhesive Tape         Past Medical History:   Diagnosis Date    Abdominal distention 5/30/2019    Acute renal failure superimposed on stage 3 chronic kidney disease ([address] 6/26/2019    Anemia 5/30/2019    Cancer ([facility] [address] 5/'19    Ovarian    Colon polyp     Eczema     Elevated BUN     AND CREATINE    Essential hypertension 5/30/2019    Generalized abdominal pain 5/30/2019    GERD (gastroesophageal reflux disease)     History of blood transfusion 5/'19    Mixed hyperlipidemia 5/30/2019    Osteoarthritis     Ovary neoplasm     Type 2 diabetes mellitus with hyperglycemia, without long-term current use of insulin ([address] 5/30/2019       Health Maintenance Due   Topic Date Due    Diabetic foot exam  12/31/1964    HIV screen  12/31/1969    DTaP/Tdap/Td vaccine (1 - Tdap) 12/31/1973    Shingles current use of insulin ([address] - Primary     Continue watching diet. Check hemoglobin A1c and urine microalbumin. Relevant Medications    Blood Glucose Monitoring Suppl (BLOOD GLUCOSE MONITOR SYSTEM) w/Device KIT    Alcohol Swabs (ALCOHOL PADS) 70 % PADS    Lancets 28G MISC    blood glucose test strips (ASCENSIA AUTODISC VI;ONE TOUCH ULTRA TEST VI) strip    Other Relevant Orders    Hemoglobin A1C    Microalbumin / Creatinine Urine Ratio    Ovarian cancer (HCC)    Relevant Medications    acetaminophen (TYLENOL) 500 MG tablet    ibuprofen (ADVIL;MOTRIN) 200 MG tablet       Genitourinary    Stage 3 chronic kidney disease (HCC)     Avoid NSAID's and will monitor kidney functions            Other    Mixed hyperlipidemia     Watch saturated fats in diet and will monitor lipids         Relevant Medications    hydrochlorothiazide (HYDRODIURIL) 25 MG tablet    Other Relevant Orders    Lipid Panel    Anemia     Dr. Douglas Colbert to monitor CBC and anemia profile         Menopause     DEXA scan as it has  been over 5 years since her last one         Relevant Orders    DEXA AXIAL SKELETON W VERTEBRAL FX ASST    Need for vaccination with 13-polyvalent pneumococcal conjugate vaccine     71years old so we will give her a Prevnar 13 vaccination         Relevant Orders    Pneumococcal conjugate vaccine 13-valent           Return in about 4 months (around 10/9/2020) for Welcome to Medicare visit.    Nohemy Sauer, DO  6/9/2020

## 2020-06-09 NOTE — ASSESSMENT & PLAN NOTE
Blood pressures are stable. Continue medications and monitor blood pressures at home. Call office if systolics are over 469 over diastolics over 90.

## 2020-07-01 LAB
CREATININE URINE: 46.3 MG/DL (ref 22–328)
ESTIMATED AVERAGE GLUCOSE: 128 MG/DL
HBA1C MFR BLD: 6.1 % (ref 4–6)
MICROALBUMIN UR-MCNC: < 2 UG/ML
MICROALBUMIN/CREAT UR-RTO: NORMAL MG/G

## 2020-08-14 ENCOUNTER — HOSPITAL ENCOUNTER (OUTPATIENT)
Age: 65
Discharge: HOME | End: 2020-08-16
Payer: MEDICARE

## 2020-08-14 ENCOUNTER — OFFICE VISIT (OUTPATIENT)
Dept: FAMILY MEDICINE CLINIC | Age: 65
End: 2020-08-14
Payer: MEDICARE

## 2020-08-14 VITALS
TEMPERATURE: 97.2 F | BODY MASS INDEX: 33.68 KG/M2 | HEIGHT: 62 IN | WEIGHT: 183 LBS | HEART RATE: 61 BPM | OXYGEN SATURATION: 99 % | SYSTOLIC BLOOD PRESSURE: 128 MMHG | RESPIRATION RATE: 18 BRPM | DIASTOLIC BLOOD PRESSURE: 82 MMHG

## 2020-08-14 LAB
BILIRUBIN, POC: NEGATIVE
BLOOD URINE, POC: NORMAL
CLARITY, POC: CLEAR
COLOR, POC: YELLOW
GLUCOSE URINE, POC: NEGATIVE
KETONES, POC: NEGATIVE
LEUKOCYTE EST, POC: NEGATIVE
NITRITE, POC: NEGATIVE
PH, POC: 5.5
PROTEIN, POC: NEGATIVE
SPECIFIC GRAVITY, POC: 1.01
UROBILINOGEN, POC: 0.2

## 2020-08-14 PROCEDURE — 4040F PNEUMOC VAC/ADMIN/RCVD: CPT | Performed by: PHYSICIAN ASSISTANT

## 2020-08-14 PROCEDURE — G8399 PT W/DXA RESULTS DOCUMENT: HCPCS | Performed by: PHYSICIAN ASSISTANT

## 2020-08-14 PROCEDURE — 81003 URINALYSIS AUTO W/O SCOPE: CPT | Performed by: PHYSICIAN ASSISTANT

## 2020-08-14 PROCEDURE — 99213 OFFICE O/P EST LOW 20 MIN: CPT | Performed by: PHYSICIAN ASSISTANT

## 2020-08-14 PROCEDURE — 87088 URINE BACTERIA CULTURE: CPT

## 2020-08-14 PROCEDURE — 1036F TOBACCO NON-USER: CPT | Performed by: PHYSICIAN ASSISTANT

## 2020-08-14 PROCEDURE — 3017F COLORECTAL CA SCREEN DOC REV: CPT | Performed by: PHYSICIAN ASSISTANT

## 2020-08-14 PROCEDURE — 1090F PRES/ABSN URINE INCON ASSESS: CPT | Performed by: PHYSICIAN ASSISTANT

## 2020-08-14 PROCEDURE — G8417 CALC BMI ABV UP PARAM F/U: HCPCS | Performed by: PHYSICIAN ASSISTANT

## 2020-08-14 PROCEDURE — G8427 DOCREV CUR MEDS BY ELIG CLIN: HCPCS | Performed by: PHYSICIAN ASSISTANT

## 2020-08-14 PROCEDURE — 1123F ACP DISCUSS/DSCN MKR DOCD: CPT | Performed by: PHYSICIAN ASSISTANT

## 2020-08-14 RX ORDER — NITROFURANTOIN 25; 75 MG/1; MG/1
100 CAPSULE ORAL 2 TIMES DAILY
Qty: 6 CAPSULE | Refills: 0 | Status: SHIPPED | OUTPATIENT
Start: 2020-08-14 | End: 2020-08-17

## 2020-08-14 NOTE — PROGRESS NOTES
Chief Complaint:   Urinary Tract Infection (pressure with urination)      History of Present Illness       Rafi Medina is a 71 y.o. old female who  presents to [facility] for urinary pressure for 1 week. Denies back or flank pain. Denies fever or chills. Has not taken any medications for her symptoms. Denies any pelvic pain, abdominal pain, vaginal discharge, vomiting, diarrhea, or lethargy. Patient denies other associated symptoms. Patient does have a long history of gynecological cancers and currently is being monitored as her Ca1 25 has been trending up. Patient denies other symptoms or presents for evaluation      ROS    Unless otherwise stated in this report or unable to obtain because of the patient's clinical or mental status as evidenced by the medical record, this patients's positive and negative responses for Review of Systems, constitutional, psych, eyes, ENT, cardiovascular, respiratory, gastrointestinal, neurological, genitourinary, musculoskeletal, integument systems and systems related to the presenting problem are either stated in the preceding or were not pertinent or were negative for the symptoms and/or complaints related to the medical problem.     Past Medical History:   Past Medical History:   Diagnosis Date    Abdominal distention 5/30/2019    Acute renal failure superimposed on stage 3 chronic kidney disease ([address] 6/26/2019    Anemia 5/30/2019    Cancer ([address] 5/'19    Ovarian    Colon polyp     Eczema     Elevated BUN     AND CREATINE    Essential hypertension 5/30/2019    Generalized abdominal pain 5/30/2019    GERD (gastroesophageal reflux disease)     History of blood transfusion 5/'19    Mixed hyperlipidemia 5/30/2019    Osteoarthritis     Ovary neoplasm     Type 2 diabetes mellitus with hyperglycemia, without long-term current use of insulin ([address] 5/30/2019        Past Surgical history:   Past Surgical History:   Procedure Laterality Date    ABDOMEN SURGERY 12/30/2019     XL/SRIDHAR/BSO/omentectomy, tumor debulking-Dr. Jamshid Chua [facility] [address]  07/2018    OTHER SURGICAL HISTORY  10/25/2019    laparascopy pernial biopsy dilation and curettage. DR. [facility]     Social History:  reports that she has never smoked. She has never used smokeless tobacco. She reports that she does not drink alcohol or use drugs. Family History: family history includes Colon Cancer in her father; Heart Attack in her mother. Allergies: Adhesive tape    Physical Exam         VS:  /82   Pulse 61   Temp 97.2 °F (36.2 °C)   Resp 18   Ht 5' 2\" (1.575 m)   Wt 183 lb (83 kg)   SpO2 99%   BMI 33.47 kg/m²        Constitutional:  Alert, development consistent with age. HEENT:  Atraumatic. Airway patent. PERRL. Neck:  Normal ROM. Supple. Lungs:  Clear to auscultation and breath sounds equal.  Heart:  Regular rate and rhythm, normal heart sounds, without pathological murmurs, ectopy, gallops, or rubs. Abdomen: Soft, mild suprapubic tenderness without rebound, rigidity, or guarding. BS X 4. No organomegaly. Back: No CVA tenderness bilaterally. Skin:  Normal turgor. Warm, dry, without visible rash, unless noted elsewhere. Neurological:  Alert and oriented. Motor functions intact. Responds to verbal commands. Lab / Imaging Results   (All laboratory and radiology results have been personally reviewed by myself)  Labs:  Results for orders placed or performed in visit on 08/14/20   POCT Urinalysis No Micro (Auto)   Result Value Ref Range    Color, UA yellow     Clarity, UA clear     Glucose, UA POC negative     Bilirubin, UA negative     Ketones, UA negative     Spec Grav, UA 1.010     Blood, UA POC trace     pH, UA 5.5     Protein, UA POC negative     Urobilinogen, UA 0.2     Leukocytes, UA negative     Nitrite, UA negative          Medical Decision Making:    Patient is well appearing, non toxic and appropriate for outpatient management.   Plan is for symptom management and PCP follow up. Assessment / Plan     Impression(s):  Assessment and Plan:  Roberto Thompson was seen today for urinary tract infection. Diagnoses and all orders for this visit:    Dysuria  -     POCT Urinalysis No Micro (Auto)  -     Culture, Urine; Future  -     nitrofurantoin, macrocrystal-monohydrate, (MACROBID) 100 MG capsule; Take 1 capsule by mouth 2 times daily for 3 days        Follow up with PCP in 7-10 days for repeat urine and recheck of symptoms. ER if changes or worse. Advised to take all medications as directed.      RAHEL NajeraC

## 2020-08-17 LAB — URINE CULTURE, ROUTINE: NORMAL

## 2020-10-13 ENCOUNTER — OFFICE VISIT (OUTPATIENT)
Dept: PRIMARY CARE CLINIC | Age: 65
End: 2020-10-13
Payer: MEDICARE

## 2020-10-13 VITALS
OXYGEN SATURATION: 97 % | SYSTOLIC BLOOD PRESSURE: 134 MMHG | HEART RATE: 77 BPM | WEIGHT: 185 LBS | HEIGHT: 62 IN | DIASTOLIC BLOOD PRESSURE: 78 MMHG | BODY MASS INDEX: 34.04 KG/M2 | TEMPERATURE: 97.5 F

## 2020-10-13 PROCEDURE — G0402 INITIAL PREVENTIVE EXAM: HCPCS | Performed by: INTERNAL MEDICINE

## 2020-10-13 PROCEDURE — 90694 VACC AIIV4 NO PRSRV 0.5ML IM: CPT | Performed by: INTERNAL MEDICINE

## 2020-10-13 PROCEDURE — 4040F PNEUMOC VAC/ADMIN/RCVD: CPT | Performed by: INTERNAL MEDICINE

## 2020-10-13 PROCEDURE — G0008 ADMIN INFLUENZA VIRUS VAC: HCPCS | Performed by: INTERNAL MEDICINE

## 2020-10-13 PROCEDURE — 3017F COLORECTAL CA SCREEN DOC REV: CPT | Performed by: INTERNAL MEDICINE

## 2020-10-13 PROCEDURE — 1123F ACP DISCUSS/DSCN MKR DOCD: CPT | Performed by: INTERNAL MEDICINE

## 2020-10-13 RX ORDER — ANASTROZOLE 1 MG/1
TABLET ORAL
COMMUNITY
Start: 2020-09-24 | End: 2021-01-01

## 2020-10-13 ASSESSMENT — LIFESTYLE VARIABLES: HOW OFTEN DO YOU HAVE A DRINK CONTAINING ALCOHOL: 0

## 2020-10-13 ASSESSMENT — PATIENT HEALTH QUESTIONNAIRE - PHQ9
SUM OF ALL RESPONSES TO PHQ QUESTIONS 1-9: 0
SUM OF ALL RESPONSES TO PHQ9 QUESTIONS 1 & 2: 0
2. FEELING DOWN, DEPRESSED OR HOPELESS: 0
SUM OF ALL RESPONSES TO PHQ QUESTIONS 1-9: 0
1. LITTLE INTEREST OR PLEASURE IN DOING THINGS: 0

## 2020-10-13 NOTE — PATIENT INSTRUCTIONS
benefits. Some of the tests and screenings are paid in full while other may be subject to a deductible, co-insurance, and/or copay. Some of these benefits include a comprehensive review of your medical history including lifestyle, illnesses that may run in your family, and various assessments and screenings as appropriate. After reviewing your medical record and screening and assessments performed today your provider may have ordered immunizations, labs, imaging, and/or referrals for you. A list of these orders (if applicable) as well as your Preventive Care list are included within your After Visit Summary for your review. Other Preventive Recommendations:    · A preventive eye exam performed by an eye specialist is recommended every 1-2 years to screen for glaucoma; cataracts, macular degeneration, and other eye disorders. · A preventive dental visit is recommended every 6 months. · Try to get at least 150 minutes of exercise per week or 10,000 steps per day on a pedometer . · Order or download the FREE \"Exercise & Physical Activity: Your Everyday Guide\" from [employer]. Call 4-480.129.3667 or search [employer] online. · You need 6622-8636 mg of calcium and 3431-9424 IU of vitamin D per day. It is possible to meet your calcium requirement with diet alone, but a vitamin D supplement is usually necessary to meet this goal.  · When exposed to the sun, use a sunscreen that protects against both UVA and UVB radiation with an SPF of 30 or greater. Reapply every 2 to 3 hours or after sweating, drying off with a towel, or swimming. · Always wear a seat belt when traveling in a car. Always wear a helmet when riding a bicycle or motorcycle.

## 2020-10-13 NOTE — PROGRESS NOTES
Medicare Annual Wellness Visit  Name: Jill Church Date: 10/13/2020   MRN: <[id number]> Sex: Female   Age: 71 y.o. Ethnicity: Non-/Non    : 1955 Race: Charo Martinez is here for Medicare AWV    Screenings for behavioral, psychosocial and functional/safety risks, and cognitive dysfunction are all negative except as indicated below. These results, as well as other patient data from the [address] [facility] [address] form, are documented in Flowsheets linked to this Encounter. Allergies   Allergen Reactions    Adhesive Tape        Prior to Visit Medications    Medication Sig Taking? Authorizing Provider   vitamin C (ASCORBIC ACID) 500 MG tablet Take 500 mg by mouth daily Yes Historical Provider, MD   diphenhydrAMINE (BENADRYL) 25 MG tablet Take 25 mg by mouth every 6 hours as needed for Itching Yes Historical Provider, MD   hydrochlorothiazide (HYDRODIURIL) 25 MG tablet Take 12.5 mg by mouth daily  Yes Historical Provider, MD   ferrous sulfate 325 (65 Fe) MG tablet Take 325 mg by mouth daily (with breakfast) Yes Historical Provider, MD   Blood Glucose Monitoring Suppl (BLOOD GLUCOSE MONITOR SYSTEM) w/Device KIT Test blood sugars every morning and throughout the day PRN high or low blood sugar symptoms. Yes Pita Bai, DO   Alcohol Swabs (ALCOHOL PADS) 70 % PADS Test blood sugars every morning and throughout the day PRN high or low blood sugar symptoms. Yes Renetta Cobian, DO   Lancets 28G MISC Test blood sugars every morning and throughout the day PRN high or low blood sugar symptoms. Yes Renetta Cobian, DO   blood glucose test strips (ASCENSIA AUTODISC VI;ONE TOUCH ULTRA TEST VI) strip Test blood sugars every morning and throughout the day PRN high or low blood sugar symptoms.  Yes Renetta Cobian, DO   NONFORMULARY Indications: coricidin as needed for cold  Yes Historical Provider, MD   acetaminophen (TYLENOL) 500 MG tablet Take 500 mg by mouth every 6 hours as needed for Pain Yes Historical Provider, MD   ibuprofen (ADVIL;MOTRIN) 200 MG tablet Take 200 mg by mouth every 6 hours as needed for Pain Yes Historical Provider, MD   Multiple Vitamins-Minerals (THERAPEUTIC MULTIVITAMIN-MINERALS) tablet Take 1 tablet by mouth daily Yes Historical Provider, MD   Cholecalciferol (VITAMIN D3) 1000 units CAPS Take 1 capsule by mouth daily  Yes Historical Provider, MD       Past Medical History:   Diagnosis Date    Abdominal distention 5/30/2019    Acute renal failure superimposed on stage 3 chronic kidney disease ([facility] [address] 6/26/2019    Anemia 5/30/2019    Cancer ([address] 5/'19    Ovarian    Colon polyp     Eczema     Elevated BUN     AND CREATINE    Essential hypertension 5/30/2019    Generalized abdominal pain 5/30/2019    GERD (gastroesophageal reflux disease)     History of blood transfusion 5/'19    Mixed hyperlipidemia 5/30/2019    Osteoarthritis     Ovary neoplasm     Type 2 diabetes mellitus with hyperglycemia, without long-term current use of insulin ([facility] [address] 5/30/2019       Past Surgical History:   Procedure Laterality Date    ABDOMEN SURGERY  12/30/2019     XL/SRIDHAR/BSO/omentectomy, tumor debulking-Dr. Katie Mendoza [facility] [address]  07/2018    OTHER SURGICAL HISTORY  10/25/2019    laparascopy pernial biopsy dilation and curettage.  DR. [facility]       Family History   Problem Relation Age of Onset    Heart Attack Mother     Colon Cancer Father        CareTeam (Including outside providers/suppliers regularly involved in providing care):   Patient Care Team:  Renetta Cobian DO as PCP - General (Internal Medicine)  Renetta Cobian DO as PCP - [facility] Empaneled Provider    Wt Readings from Last 3 Encounters:   10/13/20 185 lb (83.9 kg)   08/14/20 183 lb (83 kg)   08/04/20 183 lb (83 kg)     Vitals:    10/13/20 0921   BP: 134/78   Site: Right Upper Arm   Position: Sitting   Cuff Size: Medium Adult   Pulse: 77   Temp: 97.5 °F (36.4 °C)   TempSrc: Temporal   SpO2: 97%   Weight: 185 lb (83.9 kg)   Height: 5' 2\" (1.575 m)     Body mass index is 33.84 kg/m². Based upon direct observation of the patient, evaluation of cognition reveals recent and remote memory intact. Patient's complete Health Risk Assessment and screening values have been reviewed and are found in Flowsheets. The following problems were reviewed today and where indicated follow up appointments were made and/or referrals ordered. Positive Risk Factor Screenings with Interventions:     General Health and ACP:  General  In general, how would you say your health is?: Good  In the past 7 days, have you experienced any of the following?  New or Increased Pain, New or Increased Fatigue, Loneliness, Social Isolation, Stress or Anger?: (!) New or Increased Pain  Do you get the social and emotional support that you need?: Yes  Do you have a Living Will?: Yes  Advance Directives     Power of  Living Will ACP-Advance Directive ACP-Power of     Not on File Not on File Filed [address] [facility] [place] Risk Interventions:  · Pain issues: medication adjusted- take both Tylenol 500 mg and Ibuprofen 400 mg ( 2 pills ) at same time tid with food    Health Habits/Nutrition:  Health Habits/Nutrition  Do you exercise for at least 20 minutes 2-3 times per week?: Yes  Have you lost any weight without trying in the past 3 months?: No  Do you eat fewer than 2 meals per day?: No  Have you seen a dentist within the past year?: Yes  Body mass index: (!) 33.83  Health Habits/Nutrition Interventions:  · Nutritional issues:  educational materials for healthy, well-balanced diet provided    Hearing/Vision:  No exam data present  Hearing/Vision  Do you or your family notice any trouble with your hearing?: No  Do you have difficulty driving, watching TV, or doing any of your daily activities because of your eyesight?: (!) Yes  Have you had an eye exam within the past year?: Yes  Hearing/Vision Interventions:  · Vision concerns:  patient declines any further evaluation/treatment for this issue    Safety:  Safety  Do you have working smoke detectors?: Yes  Have all throw rugs been removed or fastened?: (!) No  Do you have non-slip mats or surfaces in all bathtubs/showers?: Yes  Do all of your stairways have a railing or banister?: Yes  Are your doorways, halls and stairs free of clutter?: Yes  Do you always fasten your seatbelt when you are in a car?: Yes  Safety Interventions:  · Home safety tips provided    Personalized Preventive Plan   Current Health Maintenance Status  Immunization History   Administered Date(s) Administered    Influenza A ([id number]) Vaccine PF IM 11/16/2009    Influenza Virus Vaccine 09/26/2019    Pneumococcal Conjugate 13-valent ([id number]) 06/09/2020    Pneumococcal Polysaccharide ([id number]) 04/04/2013    Td (Adult), 5 Lf Tetanus Toxoid, Pf (Tenivac, Decavac) 06/02/2012        Health Maintenance   Topic Date Due    Diabetic foot exam  12/31/1964    HIV screen  12/31/1969    DTaP/Tdap/Td vaccine (1 - Tdap) 12/31/1973    Shingles Vaccine (1 of 2) 12/31/2004    Annual Wellness Visit (AWV)  08/04/2020    Diabetic retinal exam  08/16/2020    Flu vaccine (1) 09/01/2020    Breast cancer screen  12/19/2020    Lipid screen  12/26/2020    Colon cancer screen colonoscopy  04/13/2021    Pneumococcal 65+ yrs at Risk Vaccine (2 of 2 - PPSV23) 06/09/2021    A1C test (Diabetic or Prediabetic)  07/01/2021    Diabetic microalbuminuria test  07/01/2021    Potassium monitoring  09/22/2021    Creatinine monitoring  09/22/2021    DEXA (modify frequency per FRAX score)  Completed    Hepatitis C screen  Completed    Hepatitis A vaccine  Aged Out    Hib vaccine  Aged Out    Meningococcal (ACWY) vaccine  Aged Out     Recommendations for Youth[employer] Media Due: see orders and patient instructions/AVS.  .   Recommended screening schedule for the next 5-10 years is provided to the patient in written form: see Patient Instructions/AVS.    There are no diagnoses linked to this encounter. Cardiovascular Disease Risk Counseling: Assessed the patient's risk to develop cardiovascular disease and reviewed main risk factors. Reviewed steps to reduce disease risk including:   · Quitting tobacco use, reducing amount smoked, or not starting the habit  · Making healthy food choices  · Being physically active and gradualy increasing activity levels   · Reduce weight and determine a healthy BMI goal  · Monitor blood pressure and treat if higher than 140/90 mmHg  · Maintain blood total cholesterol levels under 5 mmol/l or 190 mg/dl  · Maintain LDL cholesterol levels under 3.0 mmol/l or 115 mg/dl   · Control blood glucose levels  · Consider taking aspirin (75 mg daily), once blood pressure is controlled   Provided a follow up plan.   Time spent (minutes): 4

## 2020-10-14 ENCOUNTER — TELEPHONE (OUTPATIENT)
Dept: PRIMARY CARE CLINIC | Age: 65
End: 2020-10-14

## 2020-10-14 RX ORDER — AMOXICILLIN AND CLAVULANATE POTASSIUM 875; 125 MG/1; MG/1
1 TABLET, FILM COATED ORAL 2 TIMES DAILY
Qty: 14 TABLET | Refills: 0 | Status: SHIPPED | OUTPATIENT
Start: 2020-10-14 | End: 2020-10-21

## 2020-10-14 NOTE — TELEPHONE ENCOUNTER
Pt was seen yesterday, and was told an anitbiotic would be sent into pharmacy but nothing is there.  Please advise than you

## 2021-01-01 ENCOUNTER — OFFICE VISIT (OUTPATIENT)
Dept: PRIMARY CARE CLINIC | Age: 66
End: 2021-01-01
Payer: MEDICARE

## 2021-01-01 ENCOUNTER — TELEPHONE (OUTPATIENT)
Dept: PRIMARY CARE CLINIC | Age: 66
End: 2021-01-01

## 2021-01-01 ENCOUNTER — NURSE ONLY (OUTPATIENT)
Dept: PRIMARY CARE CLINIC | Age: 66
End: 2021-01-01
Payer: MEDICARE

## 2021-01-01 VITALS
SYSTOLIC BLOOD PRESSURE: 122 MMHG | OXYGEN SATURATION: 95 % | BODY MASS INDEX: 33.49 KG/M2 | TEMPERATURE: 97.8 F | HEART RATE: 79 BPM | WEIGHT: 182 LBS | DIASTOLIC BLOOD PRESSURE: 68 MMHG | HEIGHT: 62 IN

## 2021-01-01 VITALS
TEMPERATURE: 97.8 F | HEIGHT: 62 IN | HEART RATE: 97 BPM | BODY MASS INDEX: 33.86 KG/M2 | SYSTOLIC BLOOD PRESSURE: 136 MMHG | WEIGHT: 184 LBS | OXYGEN SATURATION: 98 % | DIASTOLIC BLOOD PRESSURE: 78 MMHG

## 2021-01-01 VITALS
BODY MASS INDEX: 34.57 KG/M2 | SYSTOLIC BLOOD PRESSURE: 120 MMHG | TEMPERATURE: 97.3 F | WEIGHT: 189 LBS | DIASTOLIC BLOOD PRESSURE: 64 MMHG | OXYGEN SATURATION: 98 % | HEART RATE: 80 BPM

## 2021-01-01 DIAGNOSIS — E11.65 TYPE 2 DIABETES MELLITUS WITH HYPERGLYCEMIA, WITHOUT LONG-TERM CURRENT USE OF INSULIN (HCC): Primary | ICD-10-CM

## 2021-01-01 DIAGNOSIS — Z23 NEED FOR SHINGLES VACCINE: ICD-10-CM

## 2021-01-01 DIAGNOSIS — D64.81 ANEMIA DUE TO ANTINEOPLASTIC CHEMOTHERAPY: ICD-10-CM

## 2021-01-01 DIAGNOSIS — C80.0 CARCINOMATOSIS (HCC): ICD-10-CM

## 2021-01-01 DIAGNOSIS — C56.9 MALIGNANT NEOPLASM OF OVARY, UNSPECIFIED LATERALITY (HCC): ICD-10-CM

## 2021-01-01 DIAGNOSIS — Z00.00 ROUTINE GENERAL MEDICAL EXAMINATION AT A HEALTH CARE FACILITY: Primary | ICD-10-CM

## 2021-01-01 DIAGNOSIS — Z23 NEED FOR INFLUENZA VACCINATION: ICD-10-CM

## 2021-01-01 DIAGNOSIS — E78.2 MIXED HYPERLIPIDEMIA: ICD-10-CM

## 2021-01-01 DIAGNOSIS — N18.31 STAGE 3A CHRONIC KIDNEY DISEASE (HCC): ICD-10-CM

## 2021-01-01 DIAGNOSIS — I10 ESSENTIAL HYPERTENSION: ICD-10-CM

## 2021-01-01 DIAGNOSIS — E11.65 TYPE 2 DIABETES MELLITUS WITH HYPERGLYCEMIA, WITHOUT LONG-TERM CURRENT USE OF INSULIN (HCC): ICD-10-CM

## 2021-01-01 DIAGNOSIS — T45.1X5A ANEMIA DUE TO ANTINEOPLASTIC CHEMOTHERAPY: ICD-10-CM

## 2021-01-01 DIAGNOSIS — C78.6 MALIGNANT NEOPLASM METASTATIC TO PERITONEUM (HCC): ICD-10-CM

## 2021-01-01 LAB
CHOLESTEROL: 208 MG/DL (ref 140–200)
DIABETIC RETINOPATHY: NEGATIVE
ESTIMATED AVERAGE GLUCOSE: 123 MG/DL
ESTIMATED AVERAGE GLUCOSE: 123 MG/DL
HBA1C MFR BLD: 5.9 % (ref 4–6)
HBA1C MFR BLD: 5.9 % (ref 4–6)
HDLC SERPL-MCNC: 117 MG/DL (ref 35–85)
LDL CHOLESTEROL: 60 MG/DL
LDL/HDL RATIO: 0.5 RATIO
TRIGL SERPL-MCNC: 54 MG/DL (ref 41–189)

## 2021-01-01 PROCEDURE — 1123F ACP DISCUSS/DSCN MKR DOCD: CPT | Performed by: INTERNAL MEDICINE

## 2021-01-01 PROCEDURE — G0439 PPPS, SUBSEQ VISIT: HCPCS | Performed by: INTERNAL MEDICINE

## 2021-01-01 PROCEDURE — G0008 ADMIN INFLUENZA VIRUS VAC: HCPCS | Performed by: INTERNAL MEDICINE

## 2021-01-01 PROCEDURE — 1090F PRES/ABSN URINE INCON ASSESS: CPT | Performed by: INTERNAL MEDICINE

## 2021-01-01 PROCEDURE — 2022F DILAT RTA XM EVC RTNOPTHY: CPT | Performed by: INTERNAL MEDICINE

## 2021-01-01 PROCEDURE — 99213 OFFICE O/P EST LOW 20 MIN: CPT | Performed by: INTERNAL MEDICINE

## 2021-01-01 PROCEDURE — G8399 PT W/DXA RESULTS DOCUMENT: HCPCS | Performed by: INTERNAL MEDICINE

## 2021-01-01 PROCEDURE — 4040F PNEUMOC VAC/ADMIN/RCVD: CPT | Performed by: INTERNAL MEDICINE

## 2021-01-01 PROCEDURE — 1036F TOBACCO NON-USER: CPT | Performed by: INTERNAL MEDICINE

## 2021-01-01 PROCEDURE — 3017F COLORECTAL CA SCREEN DOC REV: CPT | Performed by: INTERNAL MEDICINE

## 2021-01-01 PROCEDURE — 3044F HG A1C LEVEL LT 7.0%: CPT | Performed by: INTERNAL MEDICINE

## 2021-01-01 PROCEDURE — G8484 FLU IMMUNIZE NO ADMIN: HCPCS | Performed by: INTERNAL MEDICINE

## 2021-01-01 PROCEDURE — G8427 DOCREV CUR MEDS BY ELIG CLIN: HCPCS | Performed by: INTERNAL MEDICINE

## 2021-01-01 PROCEDURE — 90732 PPSV23 VACC 2 YRS+ SUBQ/IM: CPT | Performed by: INTERNAL MEDICINE

## 2021-01-01 PROCEDURE — G8417 CALC BMI ABV UP PARAM F/U: HCPCS | Performed by: INTERNAL MEDICINE

## 2021-01-01 PROCEDURE — 90694 VACC AIIV4 NO PRSRV 0.5ML IM: CPT | Performed by: INTERNAL MEDICINE

## 2021-01-01 PROCEDURE — G0009 ADMIN PNEUMOCOCCAL VACCINE: HCPCS | Performed by: INTERNAL MEDICINE

## 2021-01-01 RX ORDER — AMLODIPINE BESYLATE 5 MG/1
TABLET ORAL
COMMUNITY
Start: 2021-03-26 | End: 2021-01-01

## 2021-01-01 RX ORDER — LISINOPRIL AND HYDROCHLOROTHIAZIDE 12.5; 1 MG/1; MG/1
1 TABLET ORAL 2 TIMES DAILY
COMMUNITY

## 2021-01-01 RX ORDER — PROCHLORPERAZINE MALEATE 10 MG
TABLET ORAL
COMMUNITY
Start: 2021-01-01

## 2021-01-01 SDOH — ECONOMIC STABILITY: FOOD INSECURITY: WITHIN THE PAST 12 MONTHS, YOU WORRIED THAT YOUR FOOD WOULD RUN OUT BEFORE YOU GOT MONEY TO BUY MORE.: NEVER TRUE

## 2021-01-01 SDOH — ECONOMIC STABILITY: FOOD INSECURITY: WITHIN THE PAST 12 MONTHS, THE FOOD YOU BOUGHT JUST DIDN'T LAST AND YOU DIDN'T HAVE MONEY TO GET MORE.: NEVER TRUE

## 2021-01-01 ASSESSMENT — ENCOUNTER SYMPTOMS
COLOR CHANGE: 0
COLOR CHANGE: 0
BACK PAIN: 1
VOMITING: 0
FACIAL SWELLING: 0
ABDOMINAL PAIN: 0
ROS SKIN COMMENTS: HANDS
SHORTNESS OF BREATH: 0
SORE THROAT: 0
RHINORRHEA: 0
TROUBLE SWALLOWING: 0
RHINORRHEA: 0
EYE DISCHARGE: 0
EYE ITCHING: 0
COUGH: 0
COUGH: 0
TROUBLE SWALLOWING: 0
PHOTOPHOBIA: 0
COLOR CHANGE: 0
TROUBLE SWALLOWING: 0
ABDOMINAL DISTENTION: 0
ANAL BLEEDING: 0
SHORTNESS OF BREATH: 0
EYE PAIN: 0
DIARRHEA: 0
DIARRHEA: 0
ANAL BLEEDING: 0
NAUSEA: 0
VOMITING: 0
EYE PAIN: 0
NAUSEA: 0
FACIAL SWELLING: 0
ABDOMINAL PAIN: 0
PHOTOPHOBIA: 0
DIARRHEA: 0
CONSTIPATION: 0
EYE ITCHING: 0
SORE THROAT: 0
EYE PAIN: 0
PHOTOPHOBIA: 0
STRIDOR: 0
ABDOMINAL DISTENTION: 0
VOMITING: 0
FACIAL SWELLING: 0
WHEEZING: 0
SORE THROAT: 0
STRIDOR: 0
ANAL BLEEDING: 0
WHEEZING: 0
SHORTNESS OF BREATH: 0
EYE DISCHARGE: 0
STRIDOR: 0
CONSTIPATION: 0
BLOOD IN STOOL: 0
CONSTIPATION: 0
ABDOMINAL PAIN: 0
EYE DISCHARGE: 0
BLOOD IN STOOL: 0
WHEEZING: 0
COUGH: 0
BACK PAIN: 1
RHINORRHEA: 0
BLOOD IN STOOL: 0
EYE ITCHING: 0
NAUSEA: 0

## 2021-01-01 ASSESSMENT — SOCIAL DETERMINANTS OF HEALTH (SDOH): HOW HARD IS IT FOR YOU TO PAY FOR THE VERY BASICS LIKE FOOD, HOUSING, MEDICAL CARE, AND HEATING?: NOT VERY HARD

## 2021-01-01 ASSESSMENT — PATIENT HEALTH QUESTIONNAIRE - PHQ9
SUM OF ALL RESPONSES TO PHQ QUESTIONS 1-9: 1
1. LITTLE INTEREST OR PLEASURE IN DOING THINGS: 0
SUM OF ALL RESPONSES TO PHQ QUESTIONS 1-9: 1
SUM OF ALL RESPONSES TO PHQ9 QUESTIONS 1 & 2: 1
2. FEELING DOWN, DEPRESSED OR HOPELESS: 1
SUM OF ALL RESPONSES TO PHQ QUESTIONS 1-9: 1

## 2021-01-01 ASSESSMENT — LIFESTYLE VARIABLES: HOW OFTEN DO YOU HAVE A DRINK CONTAINING ALCOHOL: 0

## 2021-01-21 ENCOUNTER — OFFICE VISIT (OUTPATIENT)
Dept: PRIMARY CARE CLINIC | Age: 66
End: 2021-01-21
Payer: MEDICARE

## 2021-01-21 ENCOUNTER — TELEPHONE (OUTPATIENT)
Dept: PRIMARY CARE CLINIC | Age: 66
End: 2021-01-21

## 2021-01-21 VITALS
TEMPERATURE: 97.2 F | DIASTOLIC BLOOD PRESSURE: 64 MMHG | HEIGHT: 62 IN | WEIGHT: 187 LBS | SYSTOLIC BLOOD PRESSURE: 128 MMHG | HEART RATE: 87 BPM | OXYGEN SATURATION: 96 % | BODY MASS INDEX: 34.41 KG/M2

## 2021-01-21 DIAGNOSIS — I10 ESSENTIAL HYPERTENSION: ICD-10-CM

## 2021-01-21 DIAGNOSIS — C57.4 PRIMARY SEROUS CARCINOMA OF UTERINE ADNEXA (HCC): ICD-10-CM

## 2021-01-21 DIAGNOSIS — C80.0 CARCINOMATOSIS (HCC): ICD-10-CM

## 2021-01-21 DIAGNOSIS — N18.30 STAGE 3 CHRONIC KIDNEY DISEASE, UNSPECIFIED WHETHER STAGE 3A OR 3B CKD (HCC): ICD-10-CM

## 2021-01-21 DIAGNOSIS — E11.65 TYPE 2 DIABETES MELLITUS WITH HYPERGLYCEMIA, WITHOUT LONG-TERM CURRENT USE OF INSULIN (HCC): Primary | ICD-10-CM

## 2021-01-21 DIAGNOSIS — E78.2 MIXED HYPERLIPIDEMIA: ICD-10-CM

## 2021-01-21 DIAGNOSIS — D64.9 ANEMIA, UNSPECIFIED TYPE: ICD-10-CM

## 2021-01-21 DIAGNOSIS — N18.31 STAGE 3A CHRONIC KIDNEY DISEASE (HCC): ICD-10-CM

## 2021-01-21 DIAGNOSIS — I10 ESSENTIAL HYPERTENSION: Primary | ICD-10-CM

## 2021-01-21 LAB — HBA1C MFR BLD: 5.8 %

## 2021-01-21 PROCEDURE — 1090F PRES/ABSN URINE INCON ASSESS: CPT | Performed by: INTERNAL MEDICINE

## 2021-01-21 PROCEDURE — 99214 OFFICE O/P EST MOD 30 MIN: CPT | Performed by: INTERNAL MEDICINE

## 2021-01-21 PROCEDURE — 2022F DILAT RTA XM EVC RTNOPTHY: CPT | Performed by: INTERNAL MEDICINE

## 2021-01-21 PROCEDURE — 3017F COLORECTAL CA SCREEN DOC REV: CPT | Performed by: INTERNAL MEDICINE

## 2021-01-21 PROCEDURE — 83036 HEMOGLOBIN GLYCOSYLATED A1C: CPT | Performed by: INTERNAL MEDICINE

## 2021-01-21 PROCEDURE — 4040F PNEUMOC VAC/ADMIN/RCVD: CPT | Performed by: INTERNAL MEDICINE

## 2021-01-21 PROCEDURE — G8427 DOCREV CUR MEDS BY ELIG CLIN: HCPCS | Performed by: INTERNAL MEDICINE

## 2021-01-21 PROCEDURE — G8484 FLU IMMUNIZE NO ADMIN: HCPCS | Performed by: INTERNAL MEDICINE

## 2021-01-21 PROCEDURE — 1123F ACP DISCUSS/DSCN MKR DOCD: CPT | Performed by: INTERNAL MEDICINE

## 2021-01-21 PROCEDURE — G8399 PT W/DXA RESULTS DOCUMENT: HCPCS | Performed by: INTERNAL MEDICINE

## 2021-01-21 PROCEDURE — 3044F HG A1C LEVEL LT 7.0%: CPT | Performed by: INTERNAL MEDICINE

## 2021-01-21 PROCEDURE — G8417 CALC BMI ABV UP PARAM F/U: HCPCS | Performed by: INTERNAL MEDICINE

## 2021-01-21 PROCEDURE — 1036F TOBACCO NON-USER: CPT | Performed by: INTERNAL MEDICINE

## 2021-01-21 RX ORDER — LISINOPRIL 10 MG/1
10 TABLET ORAL DAILY
Qty: 30 TABLET | Refills: 5 | Status: SHIPPED
Start: 2021-01-21 | End: 2021-01-21

## 2021-01-21 RX ORDER — LISINOPRIL AND HYDROCHLOROTHIAZIDE 12.5; 1 MG/1; MG/1
1 TABLET ORAL DAILY
Qty: 30 TABLET | Refills: 5 | Status: SHIPPED
Start: 2021-01-21 | End: 2021-01-01

## 2021-01-21 ASSESSMENT — ENCOUNTER SYMPTOMS
EYE ITCHING: 0
COUGH: 0
ANAL BLEEDING: 0
RHINORRHEA: 0
EYE DISCHARGE: 0
BACK PAIN: 1
COLOR CHANGE: 0
SHORTNESS OF BREATH: 0
SORE THROAT: 0
PHOTOPHOBIA: 0
TROUBLE SWALLOWING: 0
WHEEZING: 0
BLOOD IN STOOL: 0
EYE PAIN: 0
ABDOMINAL DISTENTION: 0
VOMITING: 0
ABDOMINAL PAIN: 0
NAUSEA: 0
FACIAL SWELLING: 0
CONSTIPATION: 0
DIARRHEA: 0
STRIDOR: 0

## 2021-01-21 ASSESSMENT — PATIENT HEALTH QUESTIONNAIRE - PHQ9
2. FEELING DOWN, DEPRESSED OR HOPELESS: 0
SUM OF ALL RESPONSES TO PHQ QUESTIONS 1-9: 0

## 2021-01-21 NOTE — PROGRESS NOTES
2021    Name: Gene Ha : 1955 Sex: female  Age: 76 y.o. Subjective:  Chief Complaint   Patient presents with    Hypertension     top number been high sine chemo        Hypertension  Pertinent negatives include no chest pain, headaches, palpitations or shortness of breath. Lani Leroy was diagnosed with stage IIIc ovarian cancer. She is under the care of Dr. Arslan Garcia. Was noted to have carcinomatosis on further evaluation. Her CA-125 is coming down slightly. The last one being 123. She is receiving multidose chemotherapy. Last blood work showed leukopenia of 2700, anemia with a hemoglobin of 8.9 and hematocrit of 26.8. She had no protein in her urine. Her kidney functions have decreased slightly, BUN 27 creatinine 1.1 with an estimated GFR of 50. This is a decrease since 2021. Her fasting blood sugar was 118. She has a history of type 2 diabetes mellitus now diet controlled. She checks her fasting blood sugars daily. They went up while she was on the chemotherapy because of the prednisone premedication. Fasting blood sugar yesterday was 118 but at home they can go as high as 180. We will check a hemoglobin A1c in office today. .  Urine microalbumin when she gets her blood work done at the hospital.  If her hemoglobin A1c is up we will can restart the Metformin. She has a history of hyperlipidemia but has not been on medication since her surgery. We will need to check her lipids    She has a history of chronic kidney disease stage III. We will continue to monitor. She has a history of hypertension and was on lisinopril hydrochlorothiazide previously. She elected to restart her hydrochlorothiazide as her blood pressure was going up and averaging about 381 systolic. She was given 12.5 mg a day. This went up after she was started on Avastin.   We will restart her lisinopril and monitor blood pressures    she was taken off all her medications after her surgery with exceptions of some vitamins. This included Carafate, simvastatin, ranitidine, pantoprazole, metformin, lisinopril  . She needs a mammogram as it was due in May 2019. She says she will get it scheduled after her port. Has completely healed. She had her flu shot at [facility]   She will discuss whether she should get a COVID-19 vaccination with oncologist.    She had her eye examination done with Dr. Ermelinda Kahn  in July 2019. This did not show any diabetic retinopathy. .    Review of Systems   Constitutional: Positive for fatigue. Negative for appetite change and unexpected weight change. HENT: Negative for congestion, ear pain, facial swelling, rhinorrhea, sore throat, tinnitus and trouble swallowing. Eyes: Negative for photophobia, pain, discharge, itching and visual disturbance. Respiratory: Negative for cough, shortness of breath, wheezing and stridor. Cardiovascular: Negative for chest pain, palpitations and leg swelling. Gastrointestinal: Negative for abdominal distention, abdominal pain, anal bleeding, blood in stool, constipation, diarrhea, nausea and vomiting. Endocrine: Negative for cold intolerance, heat intolerance, polydipsia, polyphagia and polyuria. Genitourinary: Negative for difficulty urinating, dysuria, flank pain, frequency, hematuria and urgency. Musculoskeletal: Positive for arthralgias and back pain. Negative for gait problem, joint swelling and myalgias. Skin: Negative for color change, pallor and rash. Allergic/Immunologic: Negative for environmental allergies and food allergies. Neurological: Negative for dizziness, tremors, seizures, syncope, speech difficulty, weakness, light-headedness, numbness and headaches. Hematological: Negative for adenopathy. Does not bruise/bleed easily. Psychiatric/Behavioral: Negative for agitation, behavioral problems, confusion, sleep disturbance and suicidal ideas. The patient is not nervous/anxious. Abdominal distention 5/30/2019    Acute renal failure superimposed on stage 3 chronic kidney disease ([address] 6/26/2019    Anemia 5/30/2019    Cancer ([address] 5/'19    Ovarian    Colon polyp     Eczema     Elevated BUN     AND CREATINE    Essential hypertension 5/30/2019    Generalized abdominal pain 5/30/2019    GERD (gastroesophageal reflux disease)     History of blood transfusion 5/'19    Mixed hyperlipidemia 5/30/2019    Osteoarthritis     Ovary neoplasm     Type 2 diabetes mellitus with hyperglycemia, without long-term current use of insulin ([address] 5/30/2019       Health Maintenance Due   Topic Date Due    Diabetic foot exam  12/31/1964    DTaP/Tdap/Td vaccine (1 - Tdap) 12/31/1973    Shingles Vaccine (1 of 2) 12/31/2004    Diabetic retinal exam  08/16/2020    Breast cancer screen  12/19/2020    Lipid screen  12/26/2020        Patient Active Problem List   Diagnosis    Generalized abdominal pain    Abdominal distention    Type 2 diabetes mellitus with hyperglycemia, without long-term current use of insulin (HCC)    Essential hypertension    Mixed hyperlipidemia    Anemia    Hepatomegaly    Ovarian cancer ([address]    Malignant ascites    Carcinomatosis ([address]    Thickened endometrium    Screening mammogram for high-risk patient    Stage 3a chronic kidney disease    S/P hysterectomy    Primary serous carcinoma of uterine adnexa ([address]    Menopause    Need for vaccination with 13-polyvalent pneumococcal conjugate vaccine        Past Surgical History:   Procedure Laterality Date    ABDOMEN SURGERY  12/30/2019     XL/SRIDHAR/BSO/omentectomy, tumor debulking-Dr. [facility]ao [facility]    CHOLECYSTECTOMY  07/2018    OTHER SURGICAL HISTORY  10/25/2019    laparascopy pernial biopsy dilation and curettage.  DR. Lars Jovel [facility]        Family History   Problem Relation Age of Onset    Heart Attack Mother     Colon Cancer Father         Social History     Tobacco Use    Smoking status: Never Smoker    Smokeless tobacco: Never Used   Substance Use Topics    Alcohol use: Never     Frequency: Never    Drug use: Never        Objective  Vitals:    01/21/21 0940   BP: 128/64   Site: Right Upper Arm   Position: Sitting   Cuff Size: Medium Adult   Pulse: 87   Temp: 97.2 °F (36.2 °C)   TempSrc: Temporal   SpO2: 96%   Weight: 187 lb (84.8 kg)   Height: 5' 2\" (1.575 m)        Exam:  Physical Exam  Vitals signs reviewed. Constitutional:       General: She is not in acute distress. Appearance: Normal appearance. She is well-developed. She is obese. HENT:      Head: Normocephalic and atraumatic. Right Ear: External ear normal.      Left Ear: External ear normal.   Eyes:      General:         Right eye: No discharge. Left eye: No discharge. Conjunctiva/sclera: Conjunctivae normal.      Pupils: Pupils are equal, round, and reactive to light. Neck:      Musculoskeletal: Normal range of motion and neck supple. Thyroid: No thyromegaly. Cardiovascular:      Rate and Rhythm: Normal rate and regular rhythm. Heart sounds: Normal heart sounds. No murmur. No friction rub. No gallop. Pulmonary:      Effort: Pulmonary effort is normal. No respiratory distress. Breath sounds: Normal breath sounds. No wheezing or rales. Chest:      Chest wall: No tenderness. Abdominal:      General: Bowel sounds are normal. There is no distension. Palpations: Abdomen is soft. There is no mass. Tenderness: There is no abdominal tenderness. There is no guarding or rebound. Musculoskeletal: Normal range of motion. General: No tenderness or deformity. Lymphadenopathy:      Cervical: No cervical adenopathy. Skin:     General: Skin is warm and dry. Coloration: Skin is not jaundiced or pale. Findings: No erythema or rash. Comments: Palpable port on her right upper chest.  No evidence of infection   Neurological:      General: No focal deficit present. Mental Status: She is alert and oriented to person, place, and time. Mental status is at baseline. Cranial Nerves: No cranial nerve deficit. Sensory: No sensory deficit. Deep Tendon Reflexes: Reflexes normal.   Psychiatric:         Mood and Affect: Mood normal.         Behavior: Behavior normal.         Thought Content: Thought content normal.         Judgment: Judgment normal.          Last labs reviewed. ASSESSMENT & PLAN :   Problem List        Circulatory    Essential hypertension     Restart lisinopril 10 mg a day, prescription given. Continue hydrochlorothiazide 12.5 mg a day. Monitor blood pressures in the morning and hold lisinopril if her blood pressures less than 088 systolic         Relevant Medications    lisinopril (PRINIVIL;ZESTRIL) 10 MG tablet       Endocrine    Type 2 diabetes mellitus with hyperglycemia, without long-term current use of insulin (formerly [facility]) - Primary     Check hemoglobin A1c. This was 5.8%.   Patient will just watch her diet and will keep an eye on her blood sugars         Relevant Medications    Blood Glucose Monitoring Suppl (BLOOD GLUCOSE MONITOR SYSTEM) w/Device KIT    Alcohol Swabs (ALCOHOL PADS) 70 % PADS    Lancets 28G MISC    blood glucose test strips (ASCENSIA AUTODISC VI;ONE TOUCH ULTRA TEST VI) strip    Other Relevant Orders    POCT glycosylated hemoglobin (Hb A1C) (Completed)       Genitourinary    Stage 3a chronic kidney disease     Monitor, avoid NSAID use, increase fluids         Primary serous carcinoma of uterine adnexa ([facility]     Continue chemotherapy with oncologist         Relevant Medications    acetaminophen (TYLENOL) 500 MG tablet    ibuprofen (ADVIL;MOTRIN) 200 MG tablet    diphenhydrAMINE (BENADRYL) 25 MG tablet    anastrozole (ARIMIDEX) 1 MG tablet       Other    Mixed hyperlipidemia     If she is fasting at her next blood work to be done at [facility], check her lipids         Relevant Medications    hydrochlorothiazide (HYDRODIURIL) 25 MG tablet    lisinopril (PRINIVIL;ZESTRIL) 10 MG tablet    Anemia     Follow-up with oncologist         [facility]     Follow-up with oncologist         Relevant Medications    acetaminophen (TYLENOL) 500 MG tablet    ibuprofen (ADVIL;MOTRIN) 200 MG tablet    diphenhydrAMINE (BENADRYL) 25 MG tablet    anastrozole (ARIMIDEX) 1 MG tablet           Return in about 3 months (around 4/21/2021), or Hypertension and DM.    Jordon Morrow DO  1/21/2021

## 2021-01-21 NOTE — ASSESSMENT & PLAN NOTE
If she is fasting at her next blood work to be done at [facility], check her lipids

## 2021-01-21 NOTE — ASSESSMENT & PLAN NOTE
Restart lisinopril 10 mg a day, prescription given. Continue hydrochlorothiazide 12.5 mg a day.   Monitor blood pressures in the morning and hold lisinopril if her blood pressures less than 813 systolic

## 2021-01-21 NOTE — TELEPHONE ENCOUNTER
Discontinue the separate lisinopril 10 mg and hydrochlorothiazide 12.5 mg pills.   Prescription for lisinopril-HCTZ 10/12.5 sent to her pharmacy just take 1 of these a day

## 2021-04-21 NOTE — ASSESSMENT & PLAN NOTE
continue the increased dose of lisinopril HCT 10/12.5 twice daily along with amlodipine 5 mg a day as her blood pressures go up with premedication for her chemotherapy

## 2021-04-21 NOTE — ASSESSMENT & PLAN NOTE
continue monitoring her blood sugars. They are always below 120.   Hemoglobin A1c will be checked with her blood work at [facility]

## 2021-04-21 NOTE — PROGRESS NOTES
2021    Name: Greg Stover : 1955 Sex: female  Age: 76 y.o. Subjective:  Chief Complaint   Patient presents with    Check-Up        Hypertension  Pertinent negatives include no chest pain, headaches, palpitations or shortness of breath. Jacob Crowder was diagnosed with stage IIIc ovarian cancer. She is under the care of Dr. Darylene Kluver. Was noted to have carcinomatosis on further evaluation. Her CA-125 is slowly rising. She is receiving multidose chemotherapy. This was on hold for a week because of her pancytopenia. On 2021 her white blood cell count was 3300, hemoglobin is 9.4 hematocrit 27.4. Platelet count then was 240,000. She was given Neulasta and on 2021 her white blood cell count went up to 8100, hemoglobin still low at 9 hematocrit 26.5 but her platelets had dropped to 78,000. They will be monitoring this at oncology. She had no protein in her urine. Her kidney functions have decreased slightly, BUN 34 creatinine 1.3 with an estimated GFR of 41. This is a decrease since 2021. Matracy Terrazas She has a history of type 2 diabetes mellitus now diet controlled. She checks her fasting blood sugars daily. They went up while she was on the chemotherapy because of the prednisone premedication. Fasting blood sugar yesterday was 118 but at home they can go as high as 180. Hopefully we can get a hemoglobin A1c when she has her blood work done at the hospital.  She also needs a fasting lipid profile sometime in the future. .    She has a history of hyperlipidemia but has not been on medication since her surgery. We will need to check her lipids    She has a history of chronic kidney disease stage III. We will continue to monitor. She has a history of hypertension . Her blood pressure was rising while in oncology especially with the premedication so Dr. Darylene Kluver elected to increase her lisinopril HCT to twice daily and add amlodipine 5 mg.   Her blood pressure has been better since then. she was taken off all her medications after her surgery with exceptions of some vitamins. This included Carafate, simvastatin, ranitidine, pantoprazole, metformin, lisinopril  . She had her mammogram done in February 2021 which showed no evidence of malignancy. She had her eye examination done with Dr. Aman Barba  in August 2021. We will get results. Previously  this did not show any diabetic retinopathy. .    Review of Systems   Constitutional: Positive for fatigue. Negative for appetite change and unexpected weight change. HENT: Negative for congestion, ear pain, facial swelling, rhinorrhea, sore throat, tinnitus and trouble swallowing. Eyes: Negative for photophobia, pain, discharge, itching and visual disturbance. Respiratory: Negative for cough, shortness of breath, wheezing and stridor. Cardiovascular: Negative for chest pain, palpitations and leg swelling. Gastrointestinal: Negative for abdominal distention, abdominal pain, anal bleeding, blood in stool, constipation, diarrhea, nausea and vomiting. Endocrine: Negative for cold intolerance, heat intolerance, polydipsia, polyphagia and polyuria. Genitourinary: Negative for difficulty urinating, dysuria, flank pain, frequency, hematuria and urgency. Musculoskeletal: Positive for arthralgias and back pain. Negative for gait problem, joint swelling and myalgias. Skin: Negative for color change, pallor and rash. Allergic/Immunologic: Negative for environmental allergies and food allergies. Neurological: Negative for dizziness, tremors, seizures, syncope, speech difficulty, weakness, light-headedness, numbness and headaches. Hematological: Negative for adenopathy. Does not bruise/bleed easily. Psychiatric/Behavioral: Negative for agitation, behavioral problems, confusion, sleep disturbance and suicidal ideas. The patient is not nervous/anxious.            Current Outpatient Medications:    lisinopril-hydroCHLOROthiazide (PRINZIDE;ZESTORETIC) 10-12.5 MG per tablet, Take 1 tablet by mouth 2 times daily, Disp: , Rfl:     Handicap Placard MISC, by Does not apply route Duration 5 years, Disp: 1 each, Rfl: 0    amLODIPine (NORVASC) 5 MG tablet, TAKE ONE TABLET BY MOUTH DAILY, Disp: , Rfl:     vitamin C (ASCORBIC ACID) 500 MG tablet, Take 500 mg by mouth daily, Disp: , Rfl:     diphenhydrAMINE (BENADRYL) 25 MG tablet, Take 25 mg by mouth every 6 hours as needed for Itching, Disp: , Rfl:     ferrous sulfate 325 (65 Fe) MG tablet, Take 325 mg by mouth daily (with breakfast), Disp: , Rfl:     Blood Glucose Monitoring Suppl (BLOOD GLUCOSE MONITOR SYSTEM) w/Device KIT, Test blood sugars every morning and throughout the day PRN high or low blood sugar symptoms. , Disp: 1 kit, Rfl: 0    Alcohol Swabs (ALCOHOL PADS) 70 % PADS, Test blood sugars every morning and throughout the day PRN high or low blood sugar symptoms. , Disp: 100 each, Rfl: 5    Lancets 28G MISC, Test blood sugars every morning and throughout the day PRN high or low blood sugar symptoms. , Disp: 100 each, Rfl: 3    blood glucose test strips (ASCENSIA AUTODISC VI;ONE TOUCH ULTRA TEST VI) strip, Test blood sugars every morning and throughout the day PRN high or low blood sugar symptoms. , Disp: 100 each, Rfl: 5    NONFORMULARY, Indications: coricidin as needed for cold , Disp: , Rfl:     acetaminophen (TYLENOL) 500 MG tablet, Take 500 mg by mouth every 6 hours as needed for Pain, Disp: , Rfl:     ibuprofen (ADVIL;MOTRIN) 200 MG tablet, Take 200 mg by mouth every 6 hours as needed for Pain, Disp: , Rfl:     Multiple Vitamins-Minerals (THERAPEUTIC MULTIVITAMIN-MINERALS) tablet, Take 1 tablet by mouth daily, Disp: , Rfl:     Cholecalciferol (VITAMIN D3) 1000 units CAPS, Take 1 capsule by mouth daily , Disp: , Rfl:      Allergies   Allergen Reactions    Adhesive Tape         Past Medical History:   Diagnosis Date    Abdominal distention 5/30/2019    Acute renal failure superimposed on stage 3 chronic kidney disease ([address] 6/26/2019    Anemia 5/30/2019    Cancer ([address] 5/'19    Ovarian    Colon polyp     Eczema     Elevated BUN     AND CREATINE    Essential hypertension 5/30/2019    Generalized abdominal pain 5/30/2019    GERD (gastroesophageal reflux disease)     History of blood transfusion 5/'19    Mixed hyperlipidemia 5/30/2019    Osteoarthritis     Ovary neoplasm     Type 2 diabetes mellitus with hyperglycemia, without long-term current use of insulin ([address] 5/30/2019       Health Maintenance Due   Topic Date Due    Diabetic foot exam  Never done    DTaP/Tdap/Td vaccine (1 - Tdap) 12/31/1973    Shingles Vaccine (1 of 2) Never done    Diabetic retinal exam  08/16/2020    Lipid screen  12/26/2020    Colon cancer screen colonoscopy  04/13/2021        Patient Active Problem List   Diagnosis    Generalized abdominal pain    Abdominal distention    Type 2 diabetes mellitus with hyperglycemia, without long-term current use of insulin (HCC)    Essential hypertension    Mixed hyperlipidemia    Anemia    Hepatomegaly    Ovarian cancer (HCC)    Malignant ascites    Carcinomatosis (HCC)    Thickened endometrium    Screening mammogram for high-risk patient    Stage 3a chronic kidney disease    S/P hysterectomy    Primary serous carcinoma of uterine adnexa ([address]    Menopause    Need for vaccination with 13-polyvalent pneumococcal conjugate vaccine        Past Surgical History:   Procedure Laterality Date    ABDOMEN SURGERY  12/30/2019     XL/SRIDHAR/BSO/omentectomy, tumor debulking-Dr. Melvina Huddl[facility]    CHOLECYSTECTOMY  07/2018    OTHER SURGICAL HISTORY  10/25/2019    laparascopy pernial biopsy dilation and curettage.  DR. Lena Norton [facility]        Family History   Problem Relation Age of Onset    Heart Attack Mother     Colon Cancer Father         Social History     Tobacco Use    Smoking status: Never Smoker    Smokeless tobacco: Never Used   Substance Use Topics    Alcohol use: Never     Frequency: Never    Drug use: Never        Objective  Vitals:    04/21/21 1025   BP: 120/64   Pulse: 80   Temp: 97.3 °F (36.3 °C)   TempSrc: Temporal   SpO2: 98%   Weight: 189 lb (85.7 kg)        Exam:  Physical Exam  Vitals signs reviewed. Constitutional:       General: She is not in acute distress. Appearance: Normal appearance. She is well-developed. She is obese. HENT:      Head: Normocephalic and atraumatic. Right Ear: External ear normal.      Left Ear: External ear normal.   Eyes:      General:         Right eye: No discharge. Left eye: No discharge. Pupils: Pupils are equal, round, and reactive to light. Comments: Pale conjunctiva   Neck:      Musculoskeletal: Normal range of motion and neck supple. Thyroid: No thyromegaly. Cardiovascular:      Rate and Rhythm: Normal rate and regular rhythm. Heart sounds: Normal heart sounds. No murmur. No friction rub. No gallop. Pulmonary:      Effort: Pulmonary effort is normal. No respiratory distress. Breath sounds: Normal breath sounds. No wheezing or rales. Chest:      Chest wall: No tenderness. Abdominal:      General: Bowel sounds are normal. There is no distension. Palpations: Abdomen is soft. There is no mass. Tenderness: There is no abdominal tenderness. There is no guarding or rebound. Musculoskeletal: Normal range of motion. General: No tenderness or deformity. Lymphadenopathy:      Cervical: No cervical adenopathy. Skin:     General: Skin is warm and dry. Coloration: Skin is not jaundiced or pale. Findings: No erythema or rash. Comments: Palpable port on her right upper chest.  No evidence of infection   Neurological:      General: No focal deficit present. Mental Status: She is alert and oriented to person, place, and time. Mental status is at baseline.       Cranial

## 2021-08-18 NOTE — ASSESSMENT & PLAN NOTE
Blood pressures are stable. Continue medications and monitor blood pressures at home. Call office if systolics are over 260 over diastolics over 90.

## 2021-08-18 NOTE — PROGRESS NOTES
2021    Name: Ros Queen : 1955 Sex: female  Age: 76 y.o. Subjective:  Chief Complaint   Patient presents with    Diabetes     4 month visit        Hypertension  Pertinent negatives include no chest pain, headaches, palpitations or shortness of breath. Diabetes  Pertinent negatives for hypoglycemia include no confusion, dizziness, headaches, nervousness/anxiousness, pallor, seizures, speech difficulty or tremors. Associated symptoms include fatigue. Pertinent negatives for diabetes include no chest pain, no polydipsia, no polyphagia, no polyuria and no weakness. Dona Morelos was diagnosed with stage IIIc ovarian cancer. She is under the care of Dr. Darleen Drummond. Was noted to have carcinomatosis on further evaluation. Her CA-125 is slowly rising. She is receiving multidose chemotherapy. This was on hold  because of her anemia. Reviewed blood work done by Dr. Darleen Drummond. She had no protein in her urine. Her kidney functions have decreased slightly,  creatinine 1.3 with an estimated GFR of 41. This is a decrease since 2021. Maegan Delia She has a history of type 2 diabetes mellitus now diet controlled. She checks her fasting blood sugars daily. They went up while she was on the chemotherapy because of the prednisone premedication. Fasting blood sugar yesterday was 118 but at home they can go as high as 180. Hopefully we can get a hemoglobin A1c when she has her blood work done at the hospital.  She also needs a fasting lipid profile sometime in the future. .    She has a history of hyperlipidemia but has not been on medication since her surgery. We will need to check her lipids    She has a history of chronic kidney disease stage III. We will continue to monitor. She has a history of hypertension . Her blood pressure was rising while in oncology especially with the premedication so Dr. Darleen Drummond elected to increase her lisinopril HCT to twice daily and add amlodipine 5 mg.   Her blood pressure has been better since then. she was taken off all her medications after her surgery with exceptions of some vitamins. This included Carafate, simvastatin, ranitidine, pantoprazole, metformin, lisinopril  . She had her mammogram done in February 2021 which showed no evidence of malignancy. She had her eye examination done with Dr. Glenys Frederick  in August 2021. We will get results. Previously  this did not show any diabetic retinopathy. .    Review of Systems   Constitutional: Positive for fatigue. Negative for appetite change and unexpected weight change. HENT: Negative for congestion, ear pain, facial swelling, rhinorrhea, sore throat, tinnitus and trouble swallowing. Eyes: Negative for photophobia, pain, discharge, itching and visual disturbance. Respiratory: Negative for cough, shortness of breath, wheezing and stridor. Cardiovascular: Negative for chest pain, palpitations and leg swelling. Gastrointestinal: Negative for abdominal distention, abdominal pain, anal bleeding, blood in stool, constipation, diarrhea, nausea and vomiting. Endocrine: Negative for cold intolerance, heat intolerance, polydipsia, polyphagia and polyuria. Genitourinary: Negative for difficulty urinating, dysuria, flank pain, frequency, hematuria and urgency. Musculoskeletal: Positive for arthralgias and back pain. Negative for gait problem, joint swelling and myalgias. Skin: Negative for color change, pallor and rash. Allergic/Immunologic: Negative for environmental allergies and food allergies. Neurological: Negative for dizziness, tremors, seizures, syncope, speech difficulty, weakness, light-headedness, numbness and headaches. Hematological: Negative for adenopathy. Does not bruise/bleed easily. Psychiatric/Behavioral: Negative for agitation, behavioral problems, confusion, sleep disturbance and suicidal ideas. The patient is not nervous/anxious.            Current Outpatient Medications:   prochlorperazine (COMPAZINE) 10 MG tablet, TAKE ONE TABLET BY MOUTH EVERY 6 HOURS AS NEEDED, Disp: , Rfl:     blood glucose test strips (ASCENSIA AUTODISC VI;ONE TOUCH ULTRA TEST VI) strip, Test blood sugars every morning and throughout the day PRN high or low blood sugar symptoms. , Disp: 100 each, Rfl: 5    amLODIPine (NORVASC) 5 MG tablet, TAKE ONE TABLET BY MOUTH DAILY, Disp: , Rfl:     lisinopril-hydroCHLOROthiazide (PRINZIDE;ZESTORETIC) 10-12.5 MG per tablet, Take 1 tablet by mouth 2 times daily, Disp: , Rfl:     Handicap Placard MISC, by Does not apply route Duration 5 years, Disp: 1 each, Rfl: 0    vitamin C (ASCORBIC ACID) 500 MG tablet, Take 500 mg by mouth daily, Disp: , Rfl:     diphenhydrAMINE (BENADRYL) 25 MG tablet, Take 25 mg by mouth every 6 hours as needed for Itching, Disp: , Rfl:     ferrous sulfate 325 (65 Fe) MG tablet, Take 325 mg by mouth daily (with breakfast), Disp: , Rfl:     Blood Glucose Monitoring Suppl (BLOOD GLUCOSE MONITOR SYSTEM) w/Device KIT, Test blood sugars every morning and throughout the day PRN high or low blood sugar symptoms. , Disp: 1 kit, Rfl: 0    Alcohol Swabs (ALCOHOL PADS) 70 % PADS, Test blood sugars every morning and throughout the day PRN high or low blood sugar symptoms. , Disp: 100 each, Rfl: 5    Lancets 28G MISC, Test blood sugars every morning and throughout the day PRN high or low blood sugar symptoms. , Disp: 100 each, Rfl: 3    NONFORMULARY, Indications: coricidin as needed for cold , Disp: , Rfl:     acetaminophen (TYLENOL) 500 MG tablet, Take 500 mg by mouth every 6 hours as needed for Pain, Disp: , Rfl:     ibuprofen (ADVIL;MOTRIN) 200 MG tablet, Take 200 mg by mouth every 6 hours as needed for Pain, Disp: , Rfl:     Multiple Vitamins-Minerals (THERAPEUTIC MULTIVITAMIN-MINERALS) tablet, Take 1 tablet by mouth daily, Disp: , Rfl:     Cholecalciferol (VITAMIN D3) 1000 units CAPS, Take 1 capsule by mouth daily , Disp: , Rfl:      Allergies Allergen Reactions    Adhesive Tape         Past Medical History:   Diagnosis Date    Abdominal distention 5/30/2019    Acute renal failure superimposed on stage 3 chronic kidney disease ([address] 6/26/2019    Anemia 5/30/2019    Cancer ([address] 5/'19    Ovarian    Colon polyp     Eczema     Elevated BUN     AND CREATINE    Essential hypertension 5/30/2019    Generalized abdominal pain 5/30/2019    GERD (gastroesophageal reflux disease)     History of blood transfusion 5/'19    Mixed hyperlipidemia 5/30/2019    Osteoarthritis     Ovary neoplasm     Type 2 diabetes mellitus with hyperglycemia, without long-term current use of insulin ([address] 5/30/2019       Health Maintenance Due   Topic Date Due    Diabetic foot exam  Never done    Shingles Vaccine (1 of 2) Never done    DTaP/Tdap/Td vaccine (1 - Tdap) 06/03/2012    Pneumococcal 65+ yrs at Risk Vaccine (2 of 2 - PPSV23) 06/09/2021    Diabetic retinal exam  08/17/2021        Patient Active Problem List   Diagnosis    Generalized abdominal pain    Abdominal distention    Type 2 diabetes mellitus with hyperglycemia, without long-term current use of insulin (HCC)    Essential hypertension    Mixed hyperlipidemia    Anemia    Hepatomegaly    Ovarian cancer (HCC)    Malignant ascites    Carcinomatosis (HCC)    Thickened endometrium    Screening mammogram for high-risk patient    Stage 3a chronic kidney disease    S/P hysterectomy    Primary serous carcinoma of uterine adnexa ([address]    Menopause    Need for vaccination with 13-polyvalent pneumococcal conjugate vaccine        Past Surgical History:   Procedure Laterality Date    ABDOMEN SURGERY  12/30/2019     XL/SRIDHAR/BSO/omentectomy, tumor debulking[facility]    CHOLECYSTECTOMY  07/2018    OTHER SURGICAL HISTORY  10/25/2019    laparascopy pernial biopsy dilation and curettage.  DR. Rissa Luna [facility]        Family History   Problem Relation Age of Onset    Heart Attack Mother     Colon Cancer Father         Social History     Tobacco Use    Smoking status: Never Smoker    Smokeless tobacco: Never Used   Vaping Use    Vaping Use: Never used   Substance Use Topics    Alcohol use: Never    Drug use: Never        Objective  Vitals:    08/18/21 0848   BP: 136/78   Pulse: 97   Temp: 97.8 °F (36.6 °C)   SpO2: 98%   Weight: 184 lb (83.5 kg)   Height: 5' 2\" (1.575 m)        Exam:  Physical Exam  Vitals reviewed. Constitutional:       General: She is not in acute distress. Appearance: Normal appearance. She is well-developed. She is obese. HENT:      Head: Normocephalic and atraumatic. Comments: Frontal alopecia     Right Ear: External ear normal.      Left Ear: External ear normal.   Eyes:      General:         Right eye: No discharge. Left eye: No discharge. Pupils: Pupils are equal, round, and reactive to light. Comments: Pale conjunctiva   Neck:      Thyroid: No thyromegaly. Cardiovascular:      Rate and Rhythm: Normal rate and regular rhythm. Heart sounds: Normal heart sounds. No murmur heard. No friction rub. No gallop. Pulmonary:      Effort: Pulmonary effort is normal. No respiratory distress. Breath sounds: Normal breath sounds. No wheezing or rales. Chest:      Chest wall: No tenderness. Abdominal:      General: Bowel sounds are normal. There is no distension. Palpations: Abdomen is soft. There is no mass. Tenderness: There is no abdominal tenderness. There is no guarding or rebound. Musculoskeletal:         General: No tenderness or deformity. Normal range of motion. Cervical back: Normal range of motion and neck supple. Lymphadenopathy:      Cervical: No cervical adenopathy. Skin:     General: Skin is warm and dry. Coloration: Skin is not jaundiced or pale. Findings: No erythema or rash.       Comments: Palpable port on her right upper chest.  No evidence of infection   Neurological: General: No focal deficit present. Mental Status: She is alert and oriented to person, place, and time. Mental status is at baseline. Cranial Nerves: No cranial nerve deficit. Sensory: No sensory deficit. Deep Tendon Reflexes: Reflexes normal.   Psychiatric:         Mood and Affect: Mood normal.         Behavior: Behavior normal.         Thought Content: Thought content normal.         Judgment: Judgment normal.          Last labs reviewed. ASSESSMENT & PLAN :   Problem List        Circulatory    Essential hypertension     Blood pressures are stable. Continue medications and monitor blood pressures at home. Call office if systolics are over 736 over diastolics over 90. Endocrine    Type 2 diabetes mellitus with hyperglycemia, without long-term current use of insulin ([facility] - Primary       monitor blood sugars. They have not been too bad so she has not required any medication to treat diabetes. When she gets her next blood work will check a hemoglobin A1c on her.   Continue her diet         Relevant Medications    Blood Glucose Monitoring Suppl (BLOOD GLUCOSE MONITOR SYSTEM) w/Device KIT    Alcohol Swabs (ALCOHOL PADS) 70 % PADS    Lancets 28G MISC    blood glucose test strips (ASCENSIA AUTODISC VI;ONE TOUCH ULTRA TEST VI) strip    Other Relevant Orders    Hemoglobin A1C    Ovarian cancer ([facility]       follow-up with oncology blood work to be done by them         Relevant Medications    acetaminophen (TYLENOL) 500 MG tablet    ibuprofen (ADVIL;MOTRIN) 200 MG tablet    diphenhydrAMINE (BENADRYL) 25 MG tablet       Genitourinary    Stage 3a chronic kidney disease       avoid NSAID use and continue to monitor                Return in about 6 months (around 2/18/2022), or HTN DM.   Tee Richmond, DO  8/18/2021

## 2021-08-18 NOTE — ASSESSMENT & PLAN NOTE
monitor blood sugars. They have not been too bad so she has not required any medication to treat diabetes. When she gets her next blood work will check a hemoglobin A1c on her.   Continue her diet

## 2021-08-20 NOTE — TELEPHONE ENCOUNTER
Asim Irene said she checked with Dr Jon Huitron on the shingles shot and also the pneumonia shot and she said they were fine as long as she waits an appropriate amount of time in between them. She would like you to order the pneumonia one so she can set up an appt to get it.

## 2021-08-24 NOTE — PATIENT INSTRUCTIONS
Patient Education        Pneumococcal Polysaccharide Vaccine: What You Need to Know  Why get vaccinated? Pneumococcal polysaccharide vaccine (PPSV23) can prevent pneumococcal disease. Pneumococcal disease refers to any illness caused by pneumococcal bacteria. These bacteria can cause many types of illnesses, including pneumonia, which is an infection of the lungs. Pneumococcal bacteria are one of the most common causes of pneumonia. Besides pneumonia, pneumococcal bacteria can also cause:  · Ear infections,  · Sinus infections  · Meningitis (infection of the tissue covering the brain and spinal cord)  · Bacteremia (bloodstream infection)  Anyone can get pneumococcal disease, but children under 3years of age, people with certain medical conditions, adults 72 years or older, and cigarette smokers are at the highest risk. Most pneumococcal infections are mild. However, some can result in long-term problems, such as brain damage or hearing loss. Meningitis, bacteremia, and pneumonia caused by pneumococcal disease can be fatal.  PPSV23  PPSV23 protects against 23 types of bacteria that cause pneumococcal disease. PPSV23 is recommended for:  · All adults 72 years or older,  · Anyone 2 years or older with certain medical conditions that can lead to an increased risk for pneumococcal disease. Most people need only one dose of PPSV23. A second dose of PPSV23, and another type of pneumococcal vaccine called PCV13, are recommended for certain high-risk groups. Your health care provider can give you more information. People 65 years or older should get a dose of PPSV23 even if they have already gotten one or more doses of the vaccine before they turned 65. Talk with your health care provider  Tell your vaccine provider if the person getting the vaccine:  · Has had an allergic reaction after a previous dose of PPSV23, or has any severe, life-threatening allergies.   In some cases, your health care provider may decide to postpone PPSV23 vaccination to a future visit. People with minor illnesses, such as a cold, may be vaccinated. People who are moderately or severely ill should usually wait until they recover before getting PPSV23. Your health care provider can give you more information. Risks of a vaccine reaction  · Redness or pain where the shot is given, feeling tired, fever, or muscle aches can happen after PPSV23. People sometimes faint after medical procedures, including vaccination. Tell your provider if you feel dizzy or have vision changes or ringing in the ears. As with any medicine, there is a very remote chance of a vaccine causing a severe allergic reaction, other serious injury, or death. What if there is a serious problem? An allergic reaction could occur after the vaccinated person leaves the clinic. If you see signs of a severe allergic reaction (hives, swelling of the face and throat, difficulty breathing, a fast heartbeat, dizziness, or weakness), call 9-1-1 and get the person to the nearest hospital.  For other signs that concern you, call your health care provider. Adverse reactions should be reported to the Vaccine Adverse Event Reporting System (VAERS). Your health care provider will usually file this report, or you can do it yourself. Visit the VAERS website at www.vaers. hhs.gov at www.vaers. hhs.gov or call 4-438.170.6386. VAERS is only for reporting reactions, and VAERS staff do not give medical advice. How can I learn more? · Ask your health care provider. · Call your local or state health department. · Contact the Centers for Disease Control and Prevention (CDC):  ? Call 3-477.935.6469 (1-800-CDC-INFO) or  ? Visit CDC's website at www.cdc.gov/vaccines  Vaccine Information Statement  PPSV23 Vaccine  10/30/2019  [facility] for Disease Control and Prevention  Many Vaccine Information Statements are available in [place] and other languages.  See www.immunize.org/vis. Hojas de información Sobre Vacunas están disponibles en español y en muchos otros idiomas. Visite Soy.si. Care instructions adapted under license by [facility] ([facility]). If you have questions about a medical condition or this instruction, always ask your healthcare professional. [address] any warranty or liability for your use of this information.

## 2021-10-13 PROBLEM — Z23 NEED FOR INFLUENZA VACCINATION: Status: ACTIVE | Noted: 2021-01-01

## 2021-10-13 PROBLEM — Z00.00 ROUTINE GENERAL MEDICAL EXAMINATION AT A HEALTH CARE FACILITY: Status: ACTIVE | Noted: 2021-01-01

## 2021-10-13 PROBLEM — C78.6 MALIGNANT NEOPLASM METASTATIC TO PERITONEUM (HCC): Status: ACTIVE | Noted: 2021-01-01

## 2021-10-13 PROBLEM — Z23 NEED FOR SHINGLES VACCINE: Status: ACTIVE | Noted: 2021-01-01

## 2021-10-13 NOTE — PATIENT INSTRUCTIONS
Eating Healthy Foods: Care Instructions  Your Care Instructions     Eating healthy foods can help lower your risk for disease. Healthy food gives you energy and keeps your heart strong, your brain active, your muscles working, and your bones strong. A healthy diet includes a variety of foods from the basic food groups: grains, vegetables, fruits, milk and milk products, and meat and beans. Some people may eat more of their favorite foods from only one food group and, as a result, miss getting the nutrients they need. So, it is important to pay attention not only to what you eat but also to what you are missing from your diet. You can eat a healthy, balanced diet by making a few small changes. Follow-up care is a key part of your treatment and safety. Be sure to make and go to all appointments, and call your doctor if you are having problems. It's also a good idea to know your test results and keep a list of the medicines you take. How can you care for yourself at home? Look at what you eat  · Keep a food diary for a week or two and record everything you eat or drink. Track the number of servings you eat from each food group. · For a balanced diet every day, eat a variety of:  ? 6 or more ounce-equivalents of grains, such as cereals, breads, crackers, rice, or pasta, every day. An ounce-equivalent is 1 slice of bread, 1 cup of ready-to-eat cereal, or ½ cup of cooked rice, cooked pasta, or cooked cereal.  ? 2½ cups of vegetables, especially:  § Dark-green vegetables such as broccoli and spinach. § Orange vegetables such as carrots and sweet potatoes. § Dry beans (such as arthur and kidney beans) and peas (such as lentils). ? 2 cups of fresh, frozen, or canned fruit. A small apple or 1 banana or orange equals 1 cup. ? 3 cups of nonfat or low-fat milk, yogurt, or other milk products. ? 5½ ounces of meat and beans, such as chicken, fish, lean meat, beans, nuts, and seeds.  One egg, 1 tablespoon of peanut butter, ½ ounce nuts or seeds, or ¼ cup of cooked beans equals 1 ounce of meat. · Learn how to read food labels for serving sizes and ingredients. Fast-food and convenience-food meals often contain few or no fruits or vegetables. Make sure you eat some fruits and vegetables to make the meal more nutritious. · Look at your food diary. For each food group, add up what you have eaten and then divide the total by the number of days. This will give you an idea of how much you are eating from each food group. See if you can find some ways to change your diet to make it more healthy. Start small  · Do not try to make dramatic changes to your diet all at once. You might feel that you are missing out on your favorite foods and then be more likely to fail. · Start slowly, and gradually change your habits. Try some of the following:  ? Use whole wheat bread instead of white bread. ? Use nonfat or low-fat milk instead of whole milk. ? Eat brown rice instead of white rice, and eat whole wheat pasta instead of white-flour pasta. ? Try low-fat cheeses and low-fat yogurt. ? Add more fruits and vegetables to meals and have them for snacks. ? Add lettuce, tomato, cucumber, and onion to sandwiches. ? Add fruit to yogurt and cereal.  Enjoy food  · You can still eat your favorite foods. You just may need to eat less of them. If your favorite foods are high in fat, salt, and sugar, limit how often you eat them, but do not cut them out entirely. · Eat a wide variety of foods. Make healthy choices when eating out  · The type of restaurant you choose can help you make healthy choices. Even fast-food chains are now offering more low-fat or healthier choices on the menu. · Choose smaller portions, or take half of your meal home. · When eating out, try:  ? A veggie pizza with a whole wheat crust or grilled chicken (instead of sausage or pepperoni).   ? Pasta with roasted vegetables, grilled chicken, or marinara sauce instead of cream sauce. ? A vegetable wrap or grilled chicken wrap. ? Broiled or poached food instead of fried or breaded items. Make healthy choices easy  · Buy packaged, prewashed, ready-to-eat fresh vegetables and fruits, such as baby carrots, salad mixes, and chopped or shredded broccoli and cauliflower. · Buy packaged, presliced fruits, such as melon or pineapple. · Choose 100% fruit or vegetable juice instead of soda. Limit juice intake to 4 to 6 oz (½ to ¾ cup) a day. · Blend low-fat yogurt, fruit juice, and canned or frozen fruit to make a smoothie for breakfast or a snack. Where can you learn more? Go to [email or web address]://[email or web address] org and sign in to your e-contratos account. Enter V993 in the [employer] box to learn more about \"Eating Healthy Foods: Care Instructions. \"     If you do not have an account, please click on the \"Sign Up Now\" link. Current as of: December 17, 2020               Content Version: 13.0  © 8608-2742 [employer]. Care instructions adapted under license by [facility] ([facility]). If you have questions about a medical condition or this instruction, always ask your healthcare professional. [address] any warranty or liability for your use of this information. Personalized Preventive Plan for Celi Ambriz - 10/13/2021  Medicare offers a range of preventive health benefits. Some of the tests and screenings are paid in full while other may be subject to a deductible, co-insurance, and/or copay. Some of these benefits include a comprehensive review of your medical history including lifestyle, illnesses that may run in your family, and various assessments and screenings as appropriate. After reviewing your medical record and screening and assessments performed today your provider may have ordered immunizations, labs, imaging, and/or referrals for you.   A list of these orders (if applicable) as well as your Preventive Care list are included within your After Visit Summary for your review. Other Preventive Recommendations:    · A preventive eye exam performed by an eye specialist is recommended every 1-2 years to screen for glaucoma; cataracts, macular degeneration, and other eye disorders. · A preventive dental visit is recommended every 6 months. · Try to get at least 150 minutes of exercise per week or 10,000 steps per day on a pedometer . · Order or download the FREE \"Exercise & Physical Activity: Your Everyday Guide\" from [employer]. Call 3-489.678.5697 or search [employer] online. · You need 6553-2380 mg of calcium and 6992-1829 IU of vitamin D per day. It is possible to meet your calcium requirement with diet alone, but a vitamin D supplement is usually necessary to meet this goal.  · When exposed to the sun, use a sunscreen that protects against both UVA and UVB radiation with an SPF of 30 or greater. Reapply every 2 to 3 hours or after sweating, drying off with a towel, or swimming. · Always wear a seat belt when traveling in a car. Always wear a helmet when riding a bicycle or motorcycle. Personalized Preventive Plan for Samantha Go - 10/13/2021  Medicare offers a range of preventive health benefits. Some of the tests and screenings are paid in full while other may be subject to a deductible, co-insurance, and/or copay. Some of these benefits include a comprehensive review of your medical history including lifestyle, illnesses that may run in your family, and various assessments and screenings as appropriate. After reviewing your medical record and screening and assessments performed today your provider may have ordered immunizations, labs, imaging, and/or referrals for you. A list of these orders (if applicable) as well as your Preventive Care list are included within your After Visit Summary for your review.     Other Preventive Recommendations:    A preventive eye exam performed by an eye specialist is recommended every 1-2 years to screen for glaucoma; cataracts, macular degeneration, and other eye disorders. A preventive dental visit is recommended every 6 months. Try to get at least 150 minutes of exercise per week or 10,000 steps per day on a pedometer . Order or download the FREE \"Exercise & Physical Activity: Your Everyday Guide\" from [employer]. Call 0-157.815.4345 or search [employer] online. You need 3730-8918 mg of calcium and 9031-4763 IU of vitamin D per day. It is possible to meet your calcium requirement with diet alone, but a vitamin D supplement is usually necessary to meet this goal.  When exposed to the sun, use a sunscreen that protects against both UVA and UVB radiation with an SPF of 30 or greater. Reapply every 2 to 3 hours or after sweating, drying off with a towel, or swimming. Always wear a seat belt when traveling in a car. Always wear a helmet when riding a bicycle or motorcycle. Patient Education        Influenza (Flu) Vaccine (Inactivated or Recombinant): What You Need to Know  Why get vaccinated? Influenza vaccine can prevent influenza (flu). Flu is a contagious disease that spreads around the [place] every year, usually between October and May. Anyone can get the flu, but it is more dangerous for some people. Infants and young children, people 72years of age and older, pregnant women, and people with certain health conditions or a weakened immune system are at greatest risk of flu complications. Pneumonia, bronchitis, sinus infections and ear infections are examples of flu-related complications. If you have a medical condition, such as heart disease, cancer or diabetes, flu can make it worse. Flu can cause fever and chills, sore throat, muscle aches, fatigue, cough, headache, and runny or stuffy nose.  Some people may have vomiting and diarrhea, though this is more common in children than adults. Each year, thousands of people in the [facility] die from flu, and many more are hospitalized. Flu vaccine prevents millions of illnesses and flu-related visits to the doctor each year. Influenza vaccine  CDC recommends everyone 10months of age and older get vaccinated every flu season. Children 6 months through 6years of age may need 2 doses during a single flu season. Everyone else needs only 1 dose each flu season. It takes about 2 weeks for protection to develop after vaccination. There are many flu viruses, and they are always changing. Each year a new flu vaccine is made to protect against three or four viruses that are likely to cause disease in the upcoming flu season. Even when the vaccine doesn't exactly match these viruses, it may still provide some protection. Influenza vaccine does not cause flu. Influenza vaccine may be given at the same time as other vaccines. Talk with your health care provider  Tell your vaccine provider if the person getting the vaccine:  Has had an allergic reaction after a previous dose of influenza vaccine, or has any severe, life-threatening allergies. Has ever had Guillain-Barré Syndrome (also called GBS). In some cases, your health care provider may decide to postpone influenza vaccination to a future visit. People with minor illnesses, such as a cold, may be vaccinated. People who are moderately or severely ill should usually wait until they recover before getting influenza vaccine. Your health care provider can give you more information. Risks of a vaccine reaction  Soreness, redness, and swelling where shot is given, fever, muscle aches, and headache can happen after influenza vaccine. There may be a very small increased risk of Guillain-Barré Syndrome (GBS) after inactivated influenza vaccine (the flu shot).   [facility] children who get the flu shot along with pneumococcal vaccine (PCV13), and/or DTaP vaccine at the same time might be slightly more likely to have a seizure caused by fever. Tell your health care provider if a child who is getting flu vaccine has ever had a seizure. People sometimes faint after medical procedures, including vaccination. Tell your provider if you feel dizzy or have vision changes or ringing in the ears. As with any medicine, there is a very remote chance of a vaccine causing a severe allergic reaction, other serious injury, or death. What if there is a serious problem? An allergic reaction could occur after the vaccinated person leaves the clinic. If you see signs of a severe allergic reaction (hives, swelling of the face and throat, difficulty breathing, a fast heartbeat, dizziness, or weakness), call 9-1-1 and get the person to the nearest hospital.  For other signs that concern you, call your health care provider. Adverse reactions should be reported to the Vaccine Adverse Event Reporting System (VAERS). Your health care provider will usually file this report, or you can do it yourself. Visit the VAERS website at www.vaers. hhs.gov or call 1-269.762.1145. VAERS is only for reporting reactions, and VAERS staff do not give medical advice. The National Vaccine Injury Compensation Program  The National Vaccine Injury Compensation Program (VICP) is a federal program that was created to compensate people who may have been injured by certain vaccines. Visit the VICP website at www.hrsa.gov/vaccinecompensation or call 5-917.373.5451 to learn about the program and about filing a claim. There is a time limit to file a claim for compensation. How can I learn more? Ask your healthcare provider. Call your local or state health department. Contact the Centers for Disease Control and Prevention (CDC): Call 2-361.481.3779 (1-800-CDC-INFO) or  Visit CDC's website at www.cdc.gov/flu  Vaccine Information Statement (Interim)  Inactivated Influenza Vaccine  8/15/2019  [address] [id number]  [facility] for Disease Control and Prevention  Many Vaccine Information Statements are available in [place] and other languages. See www.immunize.org/vis. Muchas hojas de información sobre vacunas están disponibles en español y en otros idiomas. Visite www.immunize.org/vis. Care instructions adapted under license by [facility] [place] ([facility] [place]). If you have questions about a medical condition or this instruction, always ask your healthcare professional. [address] any warranty or liability for your use of this information.

## 2021-10-13 NOTE — PROGRESS NOTES
10/13/2021    Name: Mago Ireland : 1955 Sex: female  Age: 76 y.o. Subjective:  Chief Complaint   Patient presents with    Medicare AW        HPI   Patient with a history of metastatic ovarian cancer. She has metastatic lesions in the peritoneum and retroperitoneum along with some intra-abdominal lymph nodes. She also has endometrial cancer. She is followed by Dr. Kusum Carrasquillo who has her on chemotherapy. Dr. Kusum Carrasquillo recommended the flu shot and later on her shingles shot. Review Dr. Veta Boeck oncology note dated 10/13/2021. She has a history of type 2 diabetes mellitus diet controlled. Her last hemoglobin A1c was acceptable. She has a history of hypertension on lisinopril/hydrochlorothiazide. 10/12.5 twice daily. She has a history of anemia secondary to chemotherapy and has required transfusions with red blood cells. Review of Systems   Constitutional: Positive for fatigue. Negative for appetite change and unexpected weight change. HENT: Negative for congestion, ear pain, facial swelling, rhinorrhea, sore throat, tinnitus and trouble swallowing. Eyes: Negative for photophobia, pain, discharge, itching and visual disturbance. Respiratory: Negative for cough, shortness of breath, wheezing and stridor. Cardiovascular: Negative for chest pain, palpitations and leg swelling. Gastrointestinal: Negative for abdominal pain, anal bleeding, blood in stool, constipation, diarrhea, nausea and vomiting. Endocrine: Negative for cold intolerance, heat intolerance, polydipsia, polyphagia and polyuria. Genitourinary: Negative for difficulty urinating, dysuria, flank pain, frequency, hematuria and urgency. Musculoskeletal: Negative for arthralgias, gait problem, joint swelling and myalgias. Skin: Positive for rash. Negative for color change and pallor. hands   Allergic/Immunologic: Negative for environmental allergies and food allergies.    Neurological: Negative for dizziness, tremors, seizures, syncope, speech difficulty, weakness, light-headedness, numbness and headaches. Hematological: Negative for adenopathy. Does not bruise/bleed easily. Psychiatric/Behavioral: Negative for agitation, behavioral problems, confusion, sleep disturbance and suicidal ideas. The patient is not nervous/anxious. Current Outpatient Medications:     zoster recombinant adjuvanted vaccine (SHINGRIX) 50 MCG/0.5ML SUSR injection, Inject 0.5 mLs into the muscle once for 1 dose, Disp: 0.5 mL, Rfl: 0    Niraparib Tosylate (ZEJULA) 100 MG CAPS, Take 100 mg by mouth 1 tablet at night, Disp: , Rfl:     prochlorperazine (COMPAZINE) 10 MG tablet, TAKE ONE TABLET BY MOUTH EVERY 6 HOURS AS NEEDED, Disp: , Rfl:     blood glucose test strips (ASCENSIA AUTODISC VI;ONE TOUCH ULTRA TEST VI) strip, Test blood sugars every morning and throughout the day PRN high or low blood sugar symptoms. , Disp: 100 each, Rfl: 5    lisinopril-hydroCHLOROthiazide (PRINZIDE;ZESTORETIC) 10-12.5 MG per tablet, Take 1 tablet by mouth 2 times daily, Disp: , Rfl:     Handicap Placard MISC, by Does not apply route Duration 5 years, Disp: 1 each, Rfl: 0    vitamin C (ASCORBIC ACID) 500 MG tablet, Take 500 mg by mouth daily, Disp: , Rfl:     diphenhydrAMINE (BENADRYL) 25 MG tablet, Take 25 mg by mouth every 6 hours as needed for Itching, Disp: , Rfl:     ferrous sulfate 325 (65 Fe) MG tablet, Take 325 mg by mouth daily (with breakfast), Disp: , Rfl:     Blood Glucose Monitoring Suppl (BLOOD GLUCOSE MONITOR SYSTEM) w/Device KIT, Test blood sugars every morning and throughout the day PRN high or low blood sugar symptoms. , Disp: 1 kit, Rfl: 0    Alcohol Swabs (ALCOHOL PADS) 70 % PADS, Test blood sugars every morning and throughout the day PRN high or low blood sugar symptoms. , Disp: 100 each, Rfl: 5    Lancets 28G MISC, Test blood sugars every morning and throughout the day PRN high or low blood sugar symptoms. , Disp: 100 each, Rfl: 3    NONFORMULARY, Indications: coricidin as needed for cold , Disp: , Rfl:     acetaminophen (TYLENOL) 500 MG tablet, Take 500 mg by mouth every 6 hours as needed for Pain, Disp: , Rfl:     ibuprofen (ADVIL;MOTRIN) 200 MG tablet, Take 200 mg by mouth every 6 hours as needed for Pain, Disp: , Rfl:     Multiple Vitamins-Minerals (THERAPEUTIC MULTIVITAMIN-MINERALS) tablet, Take 1 tablet by mouth daily, Disp: , Rfl:     Cholecalciferol (VITAMIN D3) 1000 units CAPS, Take 1 capsule by mouth daily , Disp: , Rfl:      Allergies   Allergen Reactions    Adhesive Tape         Past Medical History:   Diagnosis Date    Abdominal distention 5/30/2019    Acute renal failure superimposed on stage 3 chronic kidney disease ([address] 6/26/2019    Anemia 5/30/2019    Cancer ([facility] 5/'19    Ovarian    Colon polyp     Eczema     Elevated BUN     AND CREATINE    Essential hypertension 5/30/2019    Generalized abdominal pain 5/30/2019    GERD (gastroesophageal reflux disease)     History of blood transfusion 5/'19    Mixed hyperlipidemia 5/30/2019    Osteoarthritis     Ovary neoplasm     Type 2 diabetes mellitus with hyperglycemia, without long-term current use of insulin ([address] 5/30/2019       Health Maintenance Due   Topic Date Due    Shingles Vaccine (1 of 2) Never done   [employer] Visit (AWV)  10/14/2021        Patient Active Problem List   Diagnosis    Generalized abdominal pain    Abdominal distention    Type 2 diabetes mellitus with hyperglycemia, without long-term current use of insulin (HCC)    Essential hypertension    Mixed hyperlipidemia    Anemia    Hepatomegaly    Ovarian cancer ([address]    Malignant ascites    Carcinomatosis ([address]    Thickened endometrium    Screening mammogram for high-risk patient    Stage 3a chronic kidney disease    S/P hysterectomy    Primary serous carcinoma of uterine adnexa ([address]    Menopause    Need for vaccination with 13-polyvalent pneumococcal conjugate tenderness. There is no guarding or rebound. Musculoskeletal:         General: No tenderness or deformity. Normal range of motion. Cervical back: Normal range of motion and neck supple. Lymphadenopathy:      Cervical: No cervical adenopathy. Skin:     General: Skin is warm and dry. Coloration: Skin is not pale. Findings: Rash present. No erythema. Comments: hands   Neurological:      Mental Status: She is alert and oriented to person, place, and time. Cranial Nerves: No cranial nerve deficit. Sensory: No sensory deficit. Deep Tendon Reflexes: Reflexes normal.   Psychiatric:         Behavior: Behavior normal.         Thought Content: Thought content normal.         Judgment: Judgment normal.          Last labs reviewed. ASSESSMENT & PLAN :   Problem List        Endocrine    Type 2 diabetes mellitus with hyperglycemia, without long-term current use of insulin ([facility])       watch carbs and sweets in her diet. No medications given. Monitor A1c's every 3 months         Relevant Medications    Blood Glucose Monitoring Suppl (BLOOD GLUCOSE MONITOR SYSTEM) w/Device KIT    Alcohol Swabs (ALCOHOL PADS) 70 % PADS    Lancets 28G MISC    blood glucose test strips (ASCENSIA AUTODISC VI;ONE TOUCH ULTRA TEST VI) strip    Ovarian cancer ([facility])       chemotherapy per oncology         Relevant Medications    acetaminophen (TYLENOL) 500 MG tablet    ibuprofen (ADVIL;MOTRIN) 200 MG tablet    diphenhydrAMINE (BENADRYL) 25 MG tablet    Niraparib Tosylate (ZEJULA) 100 MG CAPS       Other    Need for shingles vaccine       requisition for Shingrix vaccine given to patient.   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 Rock Helms         Relevant Medications    acetaminophen (TYLENOL) 500 MG tablet    ibuprofen (ADVIL;MOTRIN) 200 MG tablet    diphenhydrAMINE (BENADRYL) 25 MG tablet    Niraparib Tosylate (ZEJULA) 100 MG CAPS    Routine general medical examination at a health care facility - Primary           Return for Medicare Annual Wellness Visit in 1 year. Kemi Ag DO  10/13/2021   Medicare Annual Wellness Visit  Name: Noah Goldberg Date: 10/13/2021   MRN: <[id number]> Sex: Female   Age: 76 y.o. Ethnicity: Non- / Non    : 1955 Race: White (non-)      Lidia Saha is here for Medicare AWV    Screenings for behavioral, psychosocial and functional/safety risks, and cognitive dysfunction are all negative except as indicated below. These results, as well as other patient data from the [address] form, are documented in Flowsheets linked to this Encounter. Allergies   Allergen Reactions    Adhesive Tape          Prior to Visit Medications    Medication Sig Taking? Authorizing Provider   zoster recombinant adjuvanted vaccine [facility]) 50 MCG/0.5ML SUSR injection Inject 0.5 mLs into the muscle once for 1 dose Yes Pita Bai,    Niraparib Tosylate (ZEJULA) 100 MG CAPS Take 100 mg by mouth 1 tablet at night Yes Historical Provider, MD   prochlorperazine (COMPAZINE) 10 MG tablet TAKE ONE TABLET BY MOUTH EVERY 6 HOURS AS NEEDED Yes Historical Provider, MD   blood glucose test strips (ASCENSIA AUTODISC VI;ONE TOUCH ULTRA TEST VI) strip Test blood sugars every morning and throughout the day PRN high or low blood sugar symptoms.  Yes Pita Bai DO   lisinopril-hydroCHLOROthiazide (PRINZIDE;ZESTORETIC) 10-12.5 MG per tablet Take 1 tablet by mouth 2 times daily Yes Historical Provider, MD   Handicap Placard MISC by Does not apply route Duration 5 years Yes Juan Griffith, DO vitamin C (ASCORBIC ACID) 500 MG tablet Take 500 mg by mouth daily Yes Historical Provider, MD   diphenhydrAMINE (BENADRYL) 25 MG tablet Take 25 mg by mouth every 6 hours as needed for Itching Yes Historical Provider, MD   ferrous sulfate 325 (65 Fe) MG tablet Take 325 mg by mouth daily (with breakfast) Yes Historical Provider, MD   Blood Glucose Monitoring Suppl (BLOOD GLUCOSE MONITOR SYSTEM) w/Device KIT Test blood sugars every morning and throughout the day PRN high or low blood sugar symptoms. Yes Pita Bai, DO   Alcohol Swabs (ALCOHOL PADS) 70 % PADS Test blood sugars every morning and throughout the day PRN high or low blood sugar symptoms. Yes Luly Fuentes, DO   Lancets 28G MISC Test blood sugars every morning and throughout the day PRN high or low blood sugar symptoms.  Yes Luly Fuentes, DO   NONFORMULARY Indications: coricidin as needed for cold  Yes Historical Provider, MD   acetaminophen (TYLENOL) 500 MG tablet Take 500 mg by mouth every 6 hours as needed for Pain Yes Historical Provider, MD   ibuprofen (ADVIL;MOTRIN) 200 MG tablet Take 200 mg by mouth every 6 hours as needed for Pain Yes Historical Provider, MD   Multiple Vitamins-Minerals (THERAPEUTIC MULTIVITAMIN-MINERALS) tablet Take 1 tablet by mouth daily Yes Historical Provider, MD   Cholecalciferol (VITAMIN D3) 1000 units CAPS Take 1 capsule by mouth daily  Yes Historical Provider, MD         Past Medical History:   Diagnosis Date    Abdominal distention 5/30/2019    Acute renal failure superimposed on stage 3 chronic kidney disease ([facility] 6/26/2019    Anemia 5/30/2019    Cancer ([facility] [address] 5/'19    Ovarian    Colon polyp     Eczema     Elevated BUN     AND CREATINE    Essential hypertension 5/30/2019    Generalized abdominal pain 5/30/2019    GERD (gastroesophageal reflux disease)     History of blood transfusion 5/'19    Mixed hyperlipidemia 5/30/2019    Osteoarthritis     Ovary neoplasm     Type 2 diabetes mellitus with hyperglycemia, without long-term current use of insulin ([facility] [address] 5/30/2019       Past Surgical History:   Procedure Laterality Date    ABDOMEN SURGERY  12/30/2019     XL/SRIDHAR/BSO/omentectomy, tumor debulking-Dr. Raúl Hernandez [facility]    CHOLECYSTECTOMY  07/2018    OTHER SURGICAL HISTORY  10/25/2019    laparascopy pernial biopsy dilation [facility]         Family History   Problem Relation Age of Onset    Heart Attack Mother     Colon Cancer Father        CareTeam (Including outside providers/suppliers regularly involved in providing care):   Patient Care Team:  Gregory Trujillo DO as PCP - General (Internal Medicine)  Gregory Trujillo DO as PCP - [facility] Empaneled Provider    Wt Readings from Last 3 Encounters:   10/13/21 182 lb (82.6 kg)   09/15/21 185 lb 8 oz (84.1 kg)   08/18/21 184 lb (83.5 kg)     Vitals:    10/13/21 1353   BP: 122/68   Pulse: 79   Temp: 97.8 °F (36.6 °C)   SpO2: 95%   Weight: 182 lb (82.6 kg)   Height: 5' 2\" (1.575 m)     Body mass index is 33.29 kg/m². Based upon direct observation of the patient, evaluation of cognition reveals recent and remote memory intact. Patient's complete Health Risk Assessment and screening values have been reviewed and are found in Flowsheets. The following problems were reviewed today and where indicated follow up appointments were made and/or referrals ordered. Positive Risk Factor Screenings with Interventions:            General Health and ACP:  General  In general, how would you say your health is?: Fair  In the past 7 days, have you experienced any of the following?  New or Increased Pain, New or Increased Fatigue, Loneliness, Social Isolation, Stress or Anger?: None of These  Do you get the social and emotional support that you need?: Yes  Do you have a Living Will?: Yes  Advance Directives     Power of  Living Will ACP-Advance Directive ACP-Power of     Not on File Not on File Not on File Not on File General Health Risk Interventions:  · No Living Will: ACP documents already completed- patient asked to provide copy to the office    Health Habits/Nutrition:  Health Habits/Nutrition  Do you exercise for at least 20 minutes 2-3 times per week?: (!) No  Have you lost any weight without trying in the past 3 months?: (!) Yes  Do you eat only one meal per day?: No  Have you seen the dentist within the past year?: (!) No  Body mass index: (!) 33.28  Health Habits/Nutrition Interventions:  · Inadequate physical activity:  patient is not ready to increase his/her physical activity level at this time   · Check with Dr Adiel Orozco re: dental appointment     Safety:  Safety  Do you have working smoke detectors?: Yes  Have all throw rugs been removed or fastened?: (!) No  Do you have non-slip mats or surfaces in all bathtubs/showers?: Yes  Do all of your stairways have a railing or banister?: (!) No  Are your doorways, halls and stairs free of clutter?: Yes  Do you always fasten your seatbelt when you are in a car?: Yes  Safety Interventions:  · Home safety tips provided     Personalized Preventive Plan   Current Health Maintenance Status  Immunization History   Administered Date(s) Administered    COVID-19, J&J, PF, 0.5 mL 03/05/2021    Influenza A ([id number]) Vaccine PF IM 11/16/2009    Influenza Virus Vaccine 09/26/2019    Influenza, Quadv, adjuvanted, 65 yrs +, IM, PF (Fluad) 10/13/2020, 10/13/2021    Pneumococcal Conjugate 13-valent ([id number]) 06/09/2020    Pneumococcal Polysaccharide ([id number]) 04/04/2013, 08/24/2021    Td (Adult), 5 Lf Tetanus Toxoid, Pf (Tenivac, Decavac) 06/02/2012        Health Maintenance   Topic Date Due    Shingles Vaccine (1 of 2) Never done   [employer] Visit (AWV)  10/14/2021    DTaP/Tdap/Td vaccine (1 - Tdap) 11/03/2021 (Originally 6/3/2012)    A1C test (Diabetic or Prediabetic)  08/18/2022    Potassium monitoring  10/13/2022    Creatinine monitoring  10/13/2022    Flu vaccine  Completed    Pneumococcal 65+ yrs at Risk Vaccine  Completed    COVID-19 Vaccine  Completed    Hepatitis A vaccine  Aged Out    Hib vaccine  Aged Out    Meningococcal (ACWY) vaccine  Aged Out     Recommendations for [employer] Due: see orders and patient instructions/AVS.  . Recommended screening schedule for the next 5-10 years is provided to the patient in written form: see Patient Katty Adams was seen today for medicare awv. 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 Suggested weight control approaches, including dietary changes behavioral modification and follow up plan. Provided educational and support documentation.   Time spent (minutes): 5

## 2021-11-12 PROBLEM — Z00.00 ROUTINE GENERAL MEDICAL EXAMINATION AT A HEALTH CARE FACILITY: Status: RESOLVED | Noted: 2021-01-01 | Resolved: 2021-01-01

## 2021-11-12 PROBLEM — Z23 NEED FOR INFLUENZA VACCINATION: Status: RESOLVED | Noted: 2021-01-01 | Resolved: 2021-01-01

## 2022-01-01 ENCOUNTER — TELEPHONE (OUTPATIENT)
Dept: PRIMARY CARE CLINIC | Age: 67
End: 2022-01-01

## 2022-01-01 LAB
CHLORIDE URINE: < 15 MEQ/L
POTASSIUM, URINE: 27.2 MEQ/L
SODIUM BLD-SCNC: 114 MEQ/L (ref 135–145)
SODIUM URINE: 11 MEQ/L